# Patient Record
Sex: FEMALE | Race: WHITE | NOT HISPANIC OR LATINO | Employment: OTHER | URBAN - METROPOLITAN AREA
[De-identification: names, ages, dates, MRNs, and addresses within clinical notes are randomized per-mention and may not be internally consistent; named-entity substitution may affect disease eponyms.]

---

## 2017-04-23 ENCOUNTER — COMMUNICATION - HEALTHEAST (OUTPATIENT)
Dept: INTERNAL MEDICINE | Facility: CLINIC | Age: 67
End: 2017-04-23

## 2017-04-23 DIAGNOSIS — I10 HYPERTENSION: ICD-10-CM

## 2017-05-17 ENCOUNTER — OFFICE VISIT - HEALTHEAST (OUTPATIENT)
Dept: INTERNAL MEDICINE | Facility: CLINIC | Age: 67
End: 2017-05-17

## 2017-05-17 DIAGNOSIS — R07.89 CHEST DISCOMFORT: ICD-10-CM

## 2017-05-17 DIAGNOSIS — E05.00 TOXIC DIFFUSE GOITER: ICD-10-CM

## 2017-05-17 DIAGNOSIS — I10 HYPERTENSION: ICD-10-CM

## 2017-05-17 DIAGNOSIS — E78.00 HYPERCHOLESTEROLEMIA: ICD-10-CM

## 2017-05-17 LAB
CHOLEST SERPL-MCNC: 221 MG/DL
FASTING STATUS PATIENT QL REPORTED: YES
HDLC SERPL-MCNC: 63 MG/DL
LDLC SERPL CALC-MCNC: 145 MG/DL
TRIGL SERPL-MCNC: 63 MG/DL

## 2017-05-17 ASSESSMENT — MIFFLIN-ST. JEOR: SCORE: 1013.27

## 2017-05-22 LAB
ATRIAL RATE - MUSE: 56 BPM
DIASTOLIC BLOOD PRESSURE - MUSE: NORMAL MMHG
INTERPRETATION ECG - MUSE: NORMAL
P AXIS - MUSE: 72 DEGREES
PR INTERVAL - MUSE: 134 MS
QRS DURATION - MUSE: 86 MS
QT - MUSE: 440 MS
QTC - MUSE: 424 MS
R AXIS - MUSE: 47 DEGREES
SYSTOLIC BLOOD PRESSURE - MUSE: NORMAL MMHG
T AXIS - MUSE: 48 DEGREES
VENTRICULAR RATE- MUSE: 56 BPM

## 2017-06-14 ENCOUNTER — HOSPITAL ENCOUNTER (OUTPATIENT)
Dept: CARDIOLOGY | Facility: HOSPITAL | Age: 67
Discharge: HOME | End: 2017-06-14
Attending: INTERNAL MEDICINE

## 2017-06-14 DIAGNOSIS — R07.89 CHEST DISCOMFORT: ICD-10-CM

## 2017-06-14 LAB
CV STRESS CURRENT BP HE: NORMAL
CV STRESS CURRENT HR HE: 101
CV STRESS CURRENT HR HE: 104
CV STRESS CURRENT HR HE: 111
CV STRESS CURRENT HR HE: 115
CV STRESS CURRENT HR HE: 115
CV STRESS CURRENT HR HE: 119
CV STRESS CURRENT HR HE: 119
CV STRESS CURRENT HR HE: 125
CV STRESS CURRENT HR HE: 125
CV STRESS CURRENT HR HE: 129
CV STRESS CURRENT HR HE: 134
CV STRESS CURRENT HR HE: 139
CV STRESS CURRENT HR HE: 146
CV STRESS CURRENT HR HE: 147
CV STRESS CURRENT HR HE: 147
CV STRESS CURRENT HR HE: 148
CV STRESS CURRENT HR HE: 148
CV STRESS CURRENT HR HE: 56
CV STRESS CURRENT HR HE: 59
CV STRESS CURRENT HR HE: 62
CV STRESS CURRENT HR HE: 75
CV STRESS CURRENT HR HE: 79
CV STRESS CURRENT HR HE: 80
CV STRESS CURRENT HR HE: 82
CV STRESS CURRENT HR HE: 82
CV STRESS CURRENT HR HE: 94
CV STRESS CURRENT HR HE: 96
CV STRESS CURRENT HR HE: 96
CV STRESS CURRENT HR HE: 97
CV STRESS CURRENT HR HE: NORMAL
CV STRESS DEVIATION TIME HE: NORMAL
CV STRESS ECHO PERCENT HR HE: NORMAL
CV STRESS EXERCISE STAGE HE: NORMAL
CV STRESS FINAL RESTING BP HE: NORMAL
CV STRESS MAX HR HE: 148
CV STRESS MAX TREADMILL GRADE HE: 14
CV STRESS MAX TREADMILL SPEED HE: 3.4
CV STRESS PEAK DIA BP HE: NORMAL
CV STRESS PEAK SYS BP HE: NORMAL
CV STRESS PHASE HE: NORMAL
CV STRESS PROTOCOL HE: NORMAL
CV STRESS RESTING PT POSITION HE: NORMAL
CV STRESS ST DEVIATION AMOUNT HE: NORMAL
CV STRESS ST DEVIATION ELEVATION HE: NORMAL
CV STRESS ST EVELATION AMOUNT HE: NORMAL
CV STRESS TEST TYPE HE: NORMAL
CV STRESS TOTAL STAGE TIME MIN 1 HE: NORMAL
ECHO EJECTION FRACTION ESTIMATED: 60 %
STRESS ECHO BASELINE BP: NORMAL
STRESS ECHO BASELINE HR: 62
STRESS ECHO CALCULATED PERCENT HR: 95 %
STRESS ECHO LAST STRESS BP: NORMAL
STRESS ECHO LAST STRESS HR: 146
STRESS ECHO POST ESTIMATED WORKLOAD: 10.3
STRESS ECHO POST EXERCISE DUR MIN: 8
STRESS ECHO POST EXERCISE DUR SEC: 59
STRESS ECHO TARGET HR: 133

## 2017-07-31 ENCOUNTER — COMMUNICATION - HEALTHEAST (OUTPATIENT)
Dept: INTERNAL MEDICINE | Facility: CLINIC | Age: 67
End: 2017-07-31

## 2017-07-31 RX ORDER — METOPROLOL SUCCINATE 25 MG/1
12.5 TABLET, EXTENDED RELEASE ORAL DAILY
Qty: 30 TABLET | Refills: 11 | Status: SHIPPED | OUTPATIENT
Start: 2017-07-31 | End: 2021-12-13

## 2018-06-28 ENCOUNTER — COMMUNICATION - HEALTHEAST (OUTPATIENT)
Dept: INTERNAL MEDICINE | Facility: CLINIC | Age: 68
End: 2018-06-28

## 2018-06-28 DIAGNOSIS — I10 HYPERTENSION: ICD-10-CM

## 2018-11-29 ENCOUNTER — AMBULATORY - HEALTHEAST (OUTPATIENT)
Dept: FAMILY MEDICINE | Facility: CLINIC | Age: 68
End: 2018-11-29

## 2018-11-29 ENCOUNTER — OFFICE VISIT - HEALTHEAST (OUTPATIENT)
Dept: INTERNAL MEDICINE | Facility: CLINIC | Age: 68
End: 2018-11-29

## 2018-11-29 DIAGNOSIS — R23.8 SKIN IRRITATION: ICD-10-CM

## 2018-11-29 DIAGNOSIS — Z12.11 SCREEN FOR COLON CANCER: ICD-10-CM

## 2018-11-29 DIAGNOSIS — Z12.31 VISIT FOR SCREENING MAMMOGRAM: ICD-10-CM

## 2018-11-29 DIAGNOSIS — Z23 FLU VACCINE NEED: ICD-10-CM

## 2018-11-29 DIAGNOSIS — R79.89 ABNORMAL CBC: ICD-10-CM

## 2018-11-29 LAB
ALBUMIN SERPL-MCNC: 4 G/DL (ref 3.5–5)
ALP SERPL-CCNC: 54 U/L (ref 45–120)
ALT SERPL W P-5'-P-CCNC: 15 U/L (ref 0–45)
ANION GAP SERPL CALCULATED.3IONS-SCNC: 9 MMOL/L (ref 5–18)
AST SERPL W P-5'-P-CCNC: 20 U/L (ref 0–40)
BASOPHILS # BLD AUTO: 0 THOU/UL (ref 0–0.2)
BASOPHILS NFR BLD AUTO: 1 % (ref 0–2)
BILIRUB SERPL-MCNC: 0.7 MG/DL (ref 0–1)
BUN SERPL-MCNC: 23 MG/DL (ref 8–22)
C REACTIVE PROTEIN LHE: <0.1 MG/DL (ref 0–0.8)
CALCIUM SERPL-MCNC: 9.5 MG/DL (ref 8.5–10.5)
CHLORIDE BLD-SCNC: 106 MMOL/L (ref 98–107)
CO2 SERPL-SCNC: 30 MMOL/L (ref 22–31)
CREAT SERPL-MCNC: 0.66 MG/DL (ref 0.6–1.1)
EOSINOPHIL # BLD AUTO: 0.1 THOU/UL (ref 0–0.4)
EOSINOPHIL NFR BLD AUTO: 2 % (ref 0–6)
ERYTHROCYTE [DISTWIDTH] IN BLOOD BY AUTOMATED COUNT: 10.1 % (ref 11–14.5)
GFR SERPL CREATININE-BSD FRML MDRD: >60 ML/MIN/1.73M2
GLUCOSE BLD-MCNC: 90 MG/DL (ref 70–125)
HCT VFR BLD AUTO: 37.7 % (ref 35–47)
HGB BLD-MCNC: 13.1 G/DL (ref 12–16)
LYMPHOCYTES # BLD AUTO: 1.3 THOU/UL (ref 0.8–4.4)
LYMPHOCYTES NFR BLD AUTO: 31 % (ref 20–40)
MCH RBC QN AUTO: 31.6 PG (ref 27–34)
MCHC RBC AUTO-ENTMCNC: 34.8 G/DL (ref 32–36)
MCV RBC AUTO: 91 FL (ref 80–100)
MONOCYTES # BLD AUTO: 0.5 THOU/UL (ref 0–0.9)
MONOCYTES NFR BLD AUTO: 13 % (ref 2–10)
NEUTROPHILS # BLD AUTO: 2.2 THOU/UL (ref 2–7.7)
NEUTROPHILS NFR BLD AUTO: 53 % (ref 50–70)
PLATELET # BLD AUTO: 181 THOU/UL (ref 140–440)
PMV BLD AUTO: 7.3 FL (ref 7–10)
POTASSIUM BLD-SCNC: 4.2 MMOL/L (ref 3.5–5)
PROT SERPL-MCNC: 7 G/DL (ref 6–8)
RBC # BLD AUTO: 4.14 MILL/UL (ref 3.8–5.4)
SODIUM SERPL-SCNC: 145 MMOL/L (ref 136–145)
T4 FREE SERPL-MCNC: 1.1 NG/DL (ref 0.7–1.8)
TSH SERPL DL<=0.005 MIU/L-ACNC: 2.74 UIU/ML (ref 0.3–5)
WBC: 4.1 THOU/UL (ref 4–11)

## 2018-11-29 ASSESSMENT — MIFFLIN-ST. JEOR: SCORE: 1012.55

## 2018-12-01 LAB
BASOPHILS # BLD AUTO: 0 THOU/UL (ref 0–0.2)
BASOPHILS NFR BLD AUTO: 1 % (ref 0–2)
EOSINOPHIL # BLD AUTO: 0.1 THOU/UL (ref 0–0.4)
EOSINOPHIL NFR BLD AUTO: 1 % (ref 0–6)
ERYTHROCYTE [DISTWIDTH] IN BLOOD BY AUTOMATED COUNT: 11.1 % (ref 11–14.5)
HCT VFR BLD AUTO: 40.7 % (ref 35–47)
HGB BLD-MCNC: 13.1 G/DL (ref 12–16)
LYMPHOCYTES # BLD AUTO: 1.2 THOU/UL (ref 0.8–4.4)
LYMPHOCYTES NFR BLD AUTO: 29 % (ref 20–40)
MCH RBC QN AUTO: 31 PG (ref 27–34)
MCHC RBC AUTO-ENTMCNC: 32.2 G/DL (ref 32–36)
MCV RBC AUTO: 96 FL (ref 80–100)
MONOCYTES # BLD AUTO: 0.7 THOU/UL (ref 0–0.9)
MONOCYTES NFR BLD AUTO: 15 % (ref 2–10)
NEUTROPHILS # BLD AUTO: 2.3 THOU/UL (ref 2–7.7)
NEUTROPHILS NFR BLD AUTO: 54 % (ref 50–70)
PATH REPORT.MICROSCOPIC SPEC OTHER STN: ABNORMAL
PLAT MORPH BLD: NORMAL
PLATELET # BLD AUTO: 201 THOU/UL (ref 140–440)
PMV BLD AUTO: 11 FL (ref 8.5–12.5)
RBC # BLD AUTO: 4.23 MILL/UL (ref 3.8–5.4)
REACTIVE LYMPHS: ABNORMAL
WBC: 4.3 THOU/UL (ref 4–11)

## 2018-12-03 LAB
LAB AP CHARGES (HE HISTORICAL CONVERSION): NORMAL
PATH REPORT.COMMENTS IMP SPEC: NORMAL
PATH REPORT.COMMENTS IMP SPEC: NORMAL
PATH REPORT.FINAL DX SPEC: NORMAL
PATH REPORT.MICROSCOPIC SPEC OTHER STN: NORMAL
PATH REPORT.RELEVANT HX SPEC: NORMAL

## 2018-12-17 ENCOUNTER — COMMUNICATION - HEALTHEAST (OUTPATIENT)
Dept: INTERNAL MEDICINE | Facility: CLINIC | Age: 68
End: 2018-12-17

## 2018-12-19 ENCOUNTER — COMMUNICATION - HEALTHEAST (OUTPATIENT)
Dept: INTERNAL MEDICINE | Facility: CLINIC | Age: 68
End: 2018-12-19

## 2019-01-16 ENCOUNTER — RECORDS - HEALTHEAST (OUTPATIENT)
Dept: ADMINISTRATIVE | Facility: OTHER | Age: 69
End: 2019-01-16

## 2019-01-16 ENCOUNTER — TRANSFERRED RECORDS (OUTPATIENT)
Dept: HEALTH INFORMATION MANAGEMENT | Facility: CLINIC | Age: 69
End: 2019-01-16

## 2019-01-16 LAB — COLOGUARD-ABSTRACT: NEGATIVE

## 2019-04-30 ENCOUNTER — COMMUNICATION - HEALTHEAST (OUTPATIENT)
Dept: INTERNAL MEDICINE | Facility: CLINIC | Age: 69
End: 2019-04-30

## 2019-11-17 ENCOUNTER — COMMUNICATION - HEALTHEAST (OUTPATIENT)
Dept: INTERNAL MEDICINE | Facility: CLINIC | Age: 69
End: 2019-11-17

## 2019-11-17 DIAGNOSIS — I10 HYPERTENSION: ICD-10-CM

## 2019-11-21 ENCOUNTER — COMMUNICATION - HEALTHEAST (OUTPATIENT)
Dept: MAMMOGRAPHY | Facility: CLINIC | Age: 69
End: 2019-11-21

## 2020-06-19 ENCOUNTER — OFFICE VISIT - HEALTHEAST (OUTPATIENT)
Dept: FAMILY MEDICINE | Facility: CLINIC | Age: 70
End: 2020-06-19

## 2020-06-19 DIAGNOSIS — H61.22 IMPACTED CERUMEN OF LEFT EAR: ICD-10-CM

## 2020-12-18 ENCOUNTER — COMMUNICATION - HEALTHEAST (OUTPATIENT)
Dept: FAMILY MEDICINE | Facility: CLINIC | Age: 70
End: 2020-12-18

## 2020-12-18 DIAGNOSIS — I10 HYPERTENSION: ICD-10-CM

## 2020-12-18 RX ORDER — ATENOLOL 25 MG/1
TABLET ORAL
Qty: 90 TABLET | Refills: 3 | Status: SHIPPED | OUTPATIENT
Start: 2020-12-18 | End: 2021-12-13

## 2021-05-25 ENCOUNTER — RECORDS - HEALTHEAST (OUTPATIENT)
Dept: ADMINISTRATIVE | Facility: CLINIC | Age: 71
End: 2021-05-25

## 2021-05-26 ENCOUNTER — RECORDS - HEALTHEAST (OUTPATIENT)
Dept: ADMINISTRATIVE | Facility: CLINIC | Age: 71
End: 2021-05-26

## 2021-05-27 ENCOUNTER — RECORDS - HEALTHEAST (OUTPATIENT)
Dept: ADMINISTRATIVE | Facility: CLINIC | Age: 71
End: 2021-05-27

## 2021-05-29 ENCOUNTER — RECORDS - HEALTHEAST (OUTPATIENT)
Dept: ADMINISTRATIVE | Facility: CLINIC | Age: 71
End: 2021-05-29

## 2021-05-31 VITALS — HEIGHT: 64 IN | WEIGHT: 111.6 LBS | BODY MASS INDEX: 19.05 KG/M2

## 2021-06-02 VITALS — BODY MASS INDEX: 20.05 KG/M2 | WEIGHT: 113.19 LBS | HEIGHT: 63 IN

## 2021-06-03 NOTE — TELEPHONE ENCOUNTER
RN cannot approve Refill Request    RN can NOT refill this medication PCP to review.  Due this month for office visit.. Last office visit: 5/17/2017 Cary Redd MD Last Physical: 4/20/2016 Last MTM visit: Visit date not found Last visit same specialty: 11/29/2018 Diaz Campos MD.  Next visit within 3 mo: Visit date not found  Next physical within 3 mo: Visit date not found      Zhanna Degroot, [facility] Triage/Med Refill 11/17/2019    Requested Prescriptions   Pending Prescriptions Disp Refills     atenolol (TENORMIN) 25 MG tablet 90 tablet 3     Sig: TAKE 1/2 TABLET BY MOUTH TWICE DAILY.       Beta-Blockers Refill Protocol Passed - 11/17/2019 11:20 PM        Passed - PCP or prescribing provider visit in past 12 months or next 3 months     Last office visit with prescriber/PCP: 5/17/2017 Cary Redd MD OR same dept: 11/29/2018 Diaz Campos MD OR same specialty: 11/29/2018 Diaz Campos MD  Last physical: 4/20/2016 Last MTM visit: Visit date not found   Next visit within 3 mo: Visit date not found  Next physical within 3 mo: Visit date not found  Prescriber OR PCP: Cary Redd MD  Last diagnosis associated with med order: 1. Hypertension  - atenolol (TENORMIN) 25 MG tablet; TAKE 1/2 TABLET BY MOUTH TWICE DAILY.  Dispense: 90 tablet; Refill: 3    If protocol passes may refill for 12 months if within 3 months of last provider visit (or a total of 15 months).             Passed - Blood pressure filed in past 12 months     BP Readings from Last 1 Encounters:   11/29/18 116/64

## 2021-06-04 VITALS
DIASTOLIC BLOOD PRESSURE: 64 MMHG | BODY MASS INDEX: 20.43 KG/M2 | WEIGHT: 115.31 LBS | HEART RATE: 61 BPM | SYSTOLIC BLOOD PRESSURE: 132 MMHG | OXYGEN SATURATION: 98 %

## 2021-06-09 NOTE — PROGRESS NOTES
ASSESSMENT/PLAN:  Impacted cerumen of left ear  Offered to perform cerumen extraction in the clinic but she wants to try at home first  Advise debrox to loosening the wax  May want to consider prophylaxis with mineral oil application three times per year  F/u prn    SUBJECTIVE:    Rachel Dixon is a 69 y.o. female who came in today     Left ear fullness x several months   Feels obstructed when she inserts q-tips for cleaning  Feels full when pushing her left ear  No pain, discharge, fever, vertigo, tinnitus, hearing loss  No h/o impacted cerumen  Does not use hearing aide  Does not use ear plugs often    Review of Systems (except those mentioned above)  Constitutional: Negative.   HENT: Negative.   Eyes: Negative.   Respiratory: Negative.   Cardiovascular: Negative.   Gastrointestinal: Negative.   Endocrine: Negative.   Genitourinary: Negative.   Musculoskeletal: Negative.   Skin: Negative.   Allergic/Immunologic: Negative.   Neurological: Negative.   Hematological: Negative.   Psychiatric/Behavioral: Negative.     Patient Active Problem List    Diagnosis Date Noted     Hyperlipidemia      Graves' Disease      Hyperthyroidism      Osteopenia      No Known Allergies  Current Outpatient Medications   Medication Sig Dispense Refill     atenolol (TENORMIN) 25 MG tablet TAKE 1/2 TABLET BY MOUTH TWICE DAILY. 90 tablet 3     aspirin 81 MG EC tablet Take 1 tablet (81 mg total) by mouth daily.  0     cholecalciferol, vitamin D3, 2,000 unit cap Take 1 capsule by mouth daily.       diphenhydrAMINE (BENADRYL) 25 mg tablet Take 25 mg by mouth at bedtime as needed for sleep.       metoprolol succinate (TOPROL XL) 25 MG Take 0.5 tablets (12.5 mg total) by mouth daily. 30 tablet 11     No current facility-administered medications for this visit.      Past Medical History:   Diagnosis Date     Hypertension      No past surgical history on file.  Social History     Socioeconomic History     Marital status:      Spouse name:  None     Number of children: None     Years of education: None     Highest education level: None   Occupational History     None   Social Needs     Financial resource strain: None     Food insecurity     Worry: None     Inability: None     Transportation needs     Medical: None     Non-medical: None   Tobacco Use     Smoking status: Never Smoker     Smokeless tobacco: Never Used   Substance and Sexual Activity     Alcohol use: None     Drug use: None     Sexual activity: None   Lifestyle     Physical activity     Days per week: None     Minutes per session: None     Stress: None   Relationships     Social connections     Talks on phone: None     Gets together: None     Attends Mosque service: None     Active member of club or organization: None     Attends meetings of clubs or organizations: None     Relationship status: None     Intimate partner violence     Fear of current or ex partner: None     Emotionally abused: None     Physically abused: None     Forced sexual activity: None   Other Topics Concern     None   Social History Narrative     None     No family history on file.      OBJECTIVE:    Vitals:    06/19/20 1321 06/19/20 1325   BP: 140/63 132/64   Patient Site: Right Arm Left Arm   Patient Position: Sitting Sitting   Cuff Size: Adult Regular Adult Regular   Pulse: 61    SpO2: 98%    Weight: 115 lb 5 oz (52.3 kg)      Body mass index is 20.43 kg/m .    Physical Exam:  Constitutional: Patient was oriented to person, place, and time. Patient appeared well-developed and well-nourished. No distress.   Head: Normocephalic and atraumatic.   Right Ear: External ear normal. Normal TM  Left Ear: External ear normal. Impacted cerumen with 100% obstruction  Skin: Skin was warm and dry. No rash noted. Patient was not diaphoretic. No erythema. No pallor.

## 2021-06-10 NOTE — PROGRESS NOTES
[facility] Follow Up Note    Assessment/Plan:  1. Chest discomfort  Typical and symptom presentation but atypical in that it occurs at rest and not with exertion.  Strong family history of premature coronary artery disease.  Personal risk factors include hypertension and hyperlipidemia-mild.  Recommendation: We will proceed with stress echocardiogram non-emergently as she has not had an episode within the last couple of weeks.  Therapeutics: If chest pain recurs, try antacid.  If no immediate relief, consider taking an additional aspirin and presenting to the emergency center for complete evaluation.  Will update labs as below and reassess cardiovascular risk factors.    - Electrocardiogram Perform and Read-personally reviewed.  Reveals normal sinus rhythm with no acute abnormality  - Echo Stress Exercise; Future  - High Sensitivity C-Reactive Protein(hsCR  - HM2(CBC w/o Differential)    2. Hypercholesterolemia  Mild dyslipidemia with family history of coronary disease  We will recheck lipids  - Lipid [place] FASTING    4.  History of Graves' Disease  Will check TSH  - Thyroid Stimulating Hormone (TSH)        Cary Redd MD    Chief Complaint:  Chief Complaint   Patient presents with     Chest Pain     More of a Discomfort      Follow-up       History of Present Illness:  Rachel is a 66 y.o. female who is here today for evaluation of an intermittent atypical chest discomfort.  Of note, she has not had it in about 3 weeks.  She states the pain is associated in the left anterior chest and sometimes radiates to the left arm and shoulder.  It never lasts more than 20 minutes.  It is not associated with diaphoresis, shortness of breath or wheezing.  There is an occasional chest flutter with this.  She states she does not notice this when she is exercising.  She and her  can do fairly extensive and lengthy walks without difficulty.  Her cardiac risk factors include hypertension and  "hyperlipidemia-mild.  There is also a very strong family history of coronary and peripheral vascular occlusive decides.  Of note she is a non-smoker.    Health maintenance is discussed.  Colon cancer screening is up-to-date.  Pap is up-to-date.  She would like to do mammography every other year.    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  Work is stressful.  History   Smoking Status     Never Smoker   Smokeless Tobacco     Not on file       Past History: As noted in the snapshot  Current Outpatient Prescriptions   Medication Sig Dispense Refill     aspirin 81 MG EC tablet Take 1 tablet (81 mg total) by mouth daily.  0     cholecalciferol, vitamin D3, 2,000 unit cap Take 1 capsule by mouth daily.       atenolol (TENORMIN) 25 MG tablet TAKE 1/2 TABLET BY MOUTH TWICE DAILY. 90 tablet 3     No current facility-administered medications for this visit.        Family History: Aburto family history for coronary artery disease    Physical Exam:  General Appearance:   She appears well and comfortable and in no acute distress  Vitals:    05/17/17 1026   BP: 124/62   Patient Site: Left Arm   Patient Position: Sitting   Cuff Size: Adult Regular   Pulse: 62   SpO2: 98%   Weight: 111 lb 9.6 oz (50.6 kg)   Height: 5' 3.5\" (1.613 m)     Wt Readings from Last 3 Encounters:   05/17/17 111 lb 9.6 oz (50.6 kg)   04/20/16 109 lb (49.4 kg)     Body mass index is 19.46 kg/(m^2).    EYES: Eyelids, conjunctiva, and sclera were normal. Pupils were normal. Cornea, iris, and lens were normal bilaterally.  HEAD, EARS, NOSE, MOUTH, AND THROAT: Head and face were normal. Hearing was normal to voice and the ears were normal to external exam. Nose appearance was normal and there was no discharge. Oropharynx was normal.  TMs were normal.  NECK: Neck appearance was normal. There were no neck masses and the " thyroid was not enlarged.  RESPIRATORY: Breathing pattern was normal and the chest moved symmetrically.   Lung sounds were equal bilaterally.  CARDIOVASCULAR: Heart rate and rhythm were normal.  S1 and S2 were normal and there were no extra sounds or murmurs. Peripheral pulses in arms and legs were normal.  Jugular venous pressure was normal.  There was no peripheral edema.  GASTROINTESTINAL: The abdomen was normal in contour.  Bowel sounds were present.   Palpation detected no tenderness, mass, or enlarged organs.   MUSCULOSKELETAL: Skeletal configuration was normal and muscle mass was normal for age. Joint appearance was overall normal.  LYMPHATIC: There were no enlarged nodes.  SKIN/HAIR/NAILS: Skin color was normal.  There were no abnormal skin lesions.  Hair and nails were normal.  NEUROLOGIC: The patient was alert and oriented to person, place, time, and circumstance. Speech was normal. Cranial nerves were normal. Motor strength was normal for age. The patient was normally coordinated.  PSYCHIATRIC:  Mood and affect were normal and the patient had normal recent and remote memory. The patient's judgment and insight were normal.

## 2021-06-22 NOTE — PROGRESS NOTES
"[facility] Clinic Note    Patient Name: Rachel Dixon  Patient Age: 68 y.o.  YOB: 1950  MRN: [id number]    Date of visit: 11/29/2018    Patient Active Problem List   Diagnosis     Hyperlipidemia     Graves' Disease     Hyperthyroidism     Osteopenia     Social History     Social History Narrative     Not on file     No family history on file.  Outpatient Encounter Medications as of 11/29/2018   Medication Sig Dispense Refill     aspirin 81 MG EC tablet Take 1 tablet (81 mg total) by mouth daily.  0     atenolol (TENORMIN) 25 MG tablet TAKE 1/2 TABLET BY MOUTH TWICE DAILY. 90 tablet 3     cholecalciferol, vitamin D3, 2,000 unit cap Take 1 capsule by mouth daily.       diphenhydrAMINE (BENADRYL) 25 mg tablet Take 25 mg by mouth at bedtime as needed for sleep.       metoprolol succinate (TOPROL XL) 25 MG Take 0.5 tablets (12.5 mg total) by mouth daily. 30 tablet 11     No facility-administered encounter medications on file as of 11/29/2018.        Chief Complaint:   Chief Complaint   Patient presents with     Foot Problem     toes on bilateral feet get inflammed on and off for varying amounts of time, painful     Colon Cancer Screening     patient due, orders pending     Mammogram     patient due, orders pending       /64 (Patient Site: Left Arm, Patient Position: Sitting, Cuff Size: Adult Regular)   Pulse 78   Ht 5' 3\" (1.6 m)   Wt 113 lb 3 oz (51.3 kg)   SpO2 98%   BMI 20.05 kg/m    HPI:   For the past 2 years if she wears socks her toes get erythematous, sore, hot and edematous.  Mostly lateral 3 toes of left foot and tip of 1,2 digits. It will last until 2 hours to two days.  This seems to happen when feet get warm - it is not just when she walks.  It doesn't seem to happen when she is under the covers.  This is becoming more persistent with lateral 3 toes.  Had no difficulty in summer time.      ROS: Pertinent ros findings in hpi, all other systems negative.    Objective/Physical Exam: " "    /64 (Patient Site: Left Arm, Patient Position: Sitting, Cuff Size: Adult Regular)   Pulse 78   Ht 5' 3\" (1.6 m)   Wt 113 lb 3 oz (51.3 kg)   SpO2 98%   BMI 20.05 kg/m      Gen: NAD, appears age  Skin: warm, dry  HENT: normocephalic atraumatic, MMM  Eyes: non-icteric, no proptosis  CV: NRRR no m/r/g, no peripheral edema  Resp: CTAB no w/r/r, normal respiratory effort  Abd: non-distended, soft  Hematologic: No petechiae or purpura    Neuro: no dysarthria or gross asymmetry  Psych: Cooperative, full affect    Left foot there is some erythema and mild swelling of the toes mostly plantar surface and laterally.  There is no evidence of skin breakdown.  She also has some erythema at the distal tips of her first and second digits on that side.  She also has mild erythema on the right fourth toe.  Dorsalis pedis 2+ bilaterally, monofilament test is normal.  Great cap refill and it blanches with pressure.  5 out of 5 strength.  No other abnormalities.    Assessment/Plan:  No results found for this or any previous visit (from the past 24 hour(s)).  No medications were ordered this encounter      ICD-10-CM    1. Visit for screening mammogram Z12.31    2. Screen for colon cancer Z12.11    3. Flu vaccine need Z23 Influenza High Dose, Seasonal   4. Skin irritation R23.8 Cologuard     Ambulatory referral to Dermatology     Comprehensive Metabolic Panel     HM1(CBC and Differential)     Thyroid Stimulating Hormone (TSH)     C-Reactive Protein     T4, Free     HM1 (CBC with Diff)       I do not have a good explanation for her symptoms, however, on the differential would be bullous pemphigoid since she has had blisters at times, vasculitis.  We will check a CRP, basic lab work as well as her see dermatology.    Patient Instructions   Plant Based Diet Handout    Eat abundant vegetables.    Only eat whole grains.    2-4 fruits/day.    Enjoy at least 2 servings of legumes (beans) or tofu daily.      Avoid animal products " such as dairy, eggs and meat. (Replace these with 1,000mcg vitamin B12 and a calcium/vitamin D supplement such as Caltrate+D3 daily.)    Avoid processed foods, sugars and oils.    Cook your own food as much as possible.    Keep a food diary and ask someone else to diet with you.    Drink 8 glasses of water a day.    [place] over [employer] Documentary - watch online.    If any worsening let me know      Counseled patient regarding treatments, treatment options, risks and benefits and diagnosis.  The patient was interactive, attentive, verbalized understanding, and we discussed plan.     Diaz Campos MD

## 2021-06-27 ENCOUNTER — HEALTH MAINTENANCE LETTER (OUTPATIENT)
Age: 71
End: 2021-06-27

## 2021-07-03 NOTE — ADDENDUM NOTE
Addendum Note by Madeline Campos MD at 11/29/2018 11:30 AM     Author: Madeline Campos MD Service: -- Author Type: Physician    Filed: 11/29/2018  8:59 PM Encounter Date: 11/29/2018 Status: Signed    : Madeline Campos MD (Physician)    Addended by: MADELINE CAMPOS on: 11/29/2018 08:59 PM        Modules accepted: Orders

## 2021-07-21 ENCOUNTER — RECORDS - HEALTHEAST (OUTPATIENT)
Dept: ADMINISTRATIVE | Facility: CLINIC | Age: 71
End: 2021-07-21

## 2021-10-16 ENCOUNTER — HEALTH MAINTENANCE LETTER (OUTPATIENT)
Age: 71
End: 2021-10-16

## 2021-12-07 ASSESSMENT — ACTIVITIES OF DAILY LIVING (ADL): CURRENT_FUNCTION: NO ASSISTANCE NEEDED

## 2021-12-13 ENCOUNTER — OFFICE VISIT (OUTPATIENT)
Dept: FAMILY MEDICINE | Facility: CLINIC | Age: 71
End: 2021-12-13
Payer: COMMERCIAL

## 2021-12-13 VITALS
SYSTOLIC BLOOD PRESSURE: 130 MMHG | HEIGHT: 63 IN | WEIGHT: 112.4 LBS | BODY MASS INDEX: 19.91 KG/M2 | HEART RATE: 64 BPM | DIASTOLIC BLOOD PRESSURE: 68 MMHG

## 2021-12-13 DIAGNOSIS — Z82.49 FAMILY HISTORY OF ABDOMINAL AORTIC ANEURYSM (AAA): ICD-10-CM

## 2021-12-13 DIAGNOSIS — M94.9 DISORDER OF BONE AND CARTILAGE: ICD-10-CM

## 2021-12-13 DIAGNOSIS — Z78.0 POST-MENOPAUSAL: ICD-10-CM

## 2021-12-13 DIAGNOSIS — Z12.31 ENCOUNTER FOR SCREENING MAMMOGRAM FOR BREAST CANCER: ICD-10-CM

## 2021-12-13 DIAGNOSIS — M89.9 DISORDER OF BONE AND CARTILAGE: ICD-10-CM

## 2021-12-13 DIAGNOSIS — Z76.89 ENCOUNTER TO ESTABLISH CARE: Primary | ICD-10-CM

## 2021-12-13 DIAGNOSIS — I73.9 PAD (PERIPHERAL ARTERY DISEASE) (H): ICD-10-CM

## 2021-12-13 DIAGNOSIS — E05.90 THYROTOXICOSIS WITHOUT THYROID STORM, UNSPECIFIED THYROTOXICOSIS TYPE: ICD-10-CM

## 2021-12-13 DIAGNOSIS — Z00.00 ENCOUNTER FOR MEDICARE ANNUAL WELLNESS EXAM: ICD-10-CM

## 2021-12-13 LAB
ANION GAP SERPL CALCULATED.3IONS-SCNC: 11 MMOL/L (ref 5–18)
BUN SERPL-MCNC: 19 MG/DL (ref 8–22)
CALCIUM SERPL-MCNC: 9.1 MG/DL (ref 8.5–10.5)
CHLORIDE BLD-SCNC: 106 MMOL/L (ref 98–107)
CHOLEST SERPL-MCNC: 246 MG/DL
CO2 SERPL-SCNC: 26 MMOL/L (ref 22–31)
CREAT SERPL-MCNC: 0.67 MG/DL (ref 0.6–1.1)
ERYTHROCYTE [DISTWIDTH] IN BLOOD BY AUTOMATED COUNT: 11.1 % (ref 10–15)
FASTING STATUS PATIENT QL REPORTED: ABNORMAL
GFR SERPL CREATININE-BSD FRML MDRD: 90 ML/MIN/1.73M2
GLUCOSE BLD-MCNC: 96 MG/DL (ref 70–125)
HCT VFR BLD AUTO: 40.1 % (ref 35–47)
HDLC SERPL-MCNC: 64 MG/DL
HGB BLD-MCNC: 13.6 G/DL (ref 11.7–15.7)
LDLC SERPL CALC-MCNC: 168 MG/DL
MCH RBC QN AUTO: 31.6 PG (ref 26.5–33)
MCHC RBC AUTO-ENTMCNC: 33.9 G/DL (ref 31.5–36.5)
MCV RBC AUTO: 93 FL (ref 78–100)
PLATELET # BLD AUTO: 176 10E3/UL (ref 150–450)
POTASSIUM BLD-SCNC: 4.1 MMOL/L (ref 3.5–5)
RBC # BLD AUTO: 4.31 10E6/UL (ref 3.8–5.2)
SODIUM SERPL-SCNC: 143 MMOL/L (ref 136–145)
TRIGL SERPL-MCNC: 72 MG/DL
TSH SERPL DL<=0.005 MIU/L-ACNC: 2.9 UIU/ML (ref 0.3–5)
WBC # BLD AUTO: 3.4 10E3/UL (ref 4–11)

## 2021-12-13 PROCEDURE — 84443 ASSAY THYROID STIM HORMONE: CPT | Performed by: FAMILY MEDICINE

## 2021-12-13 PROCEDURE — 36415 COLL VENOUS BLD VENIPUNCTURE: CPT | Performed by: FAMILY MEDICINE

## 2021-12-13 PROCEDURE — 85027 COMPLETE CBC AUTOMATED: CPT | Performed by: FAMILY MEDICINE

## 2021-12-13 PROCEDURE — G0438 PPPS, INITIAL VISIT: HCPCS | Performed by: FAMILY MEDICINE

## 2021-12-13 PROCEDURE — 80061 LIPID PANEL: CPT | Performed by: FAMILY MEDICINE

## 2021-12-13 PROCEDURE — 80048 BASIC METABOLIC PNL TOTAL CA: CPT | Performed by: FAMILY MEDICINE

## 2021-12-13 RX ORDER — ATENOLOL 25 MG/1
TABLET ORAL
Qty: 90 TABLET | Refills: 3 | Status: SHIPPED | OUTPATIENT
Start: 2021-12-13 | End: 2023-04-26

## 2021-12-13 ASSESSMENT — ACTIVITIES OF DAILY LIVING (ADL): CURRENT_FUNCTION: NO ASSISTANCE NEEDED

## 2021-12-13 ASSESSMENT — MIFFLIN-ST. JEOR: SCORE: 1003.97

## 2021-12-13 NOTE — PATIENT INSTRUCTIONS
There is a new shingles vaccine that is 97% effective. It is the Shingrix vaccine and is recommended for those 50 and older. We recommend the vaccine even if you have had shingles or the older vaccine against shingles- Zostavax. Insurance coverage for the vaccine varies. I recommend you check with your insurance to verify if, when and where it is covered. The vaccine is covered by most commercial insurance but Medicare does not cover the vaccine. It may be covered by Medicare Part D (your drug/pharmacy plan) and sometimes it is covered only at a pharmacy instead of here in the clinic. If it is covered in the clinic, you may schedule a nurse visit anytime to get the first dose of the vaccine. The second dose is recommended two months after the first and should be gotten by 6 months after the first.   About 10% of people will get a sore, red reaction at the site of the injection. Some people may also feel a little sick or nauseated after the injection. This may happen with either the first and/or second vaccine.      Patient Education   Personalized Prevention Plan  You are due for the preventive services outlined below.  Your care team is available to assist you in scheduling these services.  If you have already completed any of these items, please share that information with your care team to update in your medical record.  Health Maintenance Due   Topic Date Due     ANNUAL REVIEW OF HM ORDERS  Never done     Discuss Advance Care Planning  Never done     Colorectal Cancer Screening  Never done     COVID-19 Vaccine (1) Never done     Hepatitis C Screening  Never done     Zoster (Shingles) Vaccine (2 of 2) 06/15/2016     Pneumococcal Vaccine (1 of 1 - PPSV23) Never done     Mammogram  04/20/2018     FALL RISK ASSESSMENT  11/29/2019     Flu Vaccine (1) 09/01/2021

## 2021-12-13 NOTE — PROGRESS NOTES
"SUBJECTIVE:   Rachel Dixon is a 71 year old female who presents for Preventive Visit.      Patient has been advised of split billing requirements and indicates understanding: Yes   Are you in the first 12 months of your Medicare coverage?  No    Healthy Habits:     In general, how would you rate your overall health?  Good    Frequency of exercise:  2-3 days/week    Duration of exercise:  15-30 minutes    Do you usually eat at least 4 servings of fruit and vegetables a day, include whole grains    & fiber and avoid regularly eating high fat or \"junk\" foods?  Yes    Taking medications regularly:  Yes    Medication side effects:  None    Ability to successfully perform activities of daily living:  No assistance needed    Home Safety:  No safety concerns identified    Hearing Impairment:  No hearing concerns    In the past 6 months, have you been bothered by leaking of urine?  No    In general, how would you rate your overall mental or emotional health?  Good      PHQ-2 Total Score: 0    Additional concerns today:  No      She had a negative cologaurd in 2019  History of hyperthyroidism and took PTU, did not do an ablation. Not on meds in the past 15 years. Has been on atenolol for the palpitations/tremor initially and on it ever since due to less anxiousness feeling.    Her toes bother her: (newly brought up to me, but ongoing issue for years it sounds like) when wearing closed toed shoes and walking she gets swelling of her toes, redness also. Has tried various shoes or socks. Doesn't bother her when sitting for the day. Doesn't really bother her during the summer because she wears sandles. Painful when they are swollen and hard. Her mom with hx of PAD had similar sx.     10 years ago had [place] virus while living here in [place]; achy and low grade fever for 1 week, petechiae on legs, delirious later. Hospitalized for 5 days; spinal tap diagnosed WNV. She took a full year to recover. Multitasking is challenging for " her still.      Hx of left humerus fracture; sling is all she needed for tx.     Mom with peripheral arterial disease, HTN,   at 74yo of dissecting aortic aneurism; as did maternal grandfather.   Sometimes feels her own aorta pulsating.     She declines COVID, Shingrix and FLu vaccines.   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  Travels to [place] in the winter for a few weeks.    [employer]ab[employer]ist at [employer] [facility] [facility].    Rachel Cortez MD           Do you feel safe in your environment? Yes    Have you ever done Advance Care Planning? (For example, a Health Directive, POLST, or a discussion with a medical provider or your loved ones about your wishes): No, advance care planning information given to patient to review.  Patient plans to discuss their wishes with loved ones or provider.         Fall risk  Fallen 2 or more times in the past year?: No  Any fall with injury in the past year?: No    Cognitive Screening   1) Repeat 3 items (Leader, Season, Table)    2) Clock draw: NORMAL  3) 3 item recall: Recalls 3 objects  Results: 3 items recalled: COGNITIVE IMPAIRMENT LESS LIKELY    Mini-CogTM Copyright S Amada. Licensed by the author for use in [facility]; reprinted with permission (sai@.[facility]). All rights reserved.      Do you have sleep apnea, excessive snoring or daytime drowsiness?: yes    Reviewed and updated as needed this visit by clinical staff  Tobacco  Allergies  Meds  Problems  Med Hx  Surg Hx  Fam Hx         Reviewed and updated as needed this visit by Provider  Tobacco  Allergies  Meds  Problems  Med Hx  Surg Hx  Fam Hx        Social History     Tobacco Use     Smoking status: Never  "Smoker     Smokeless tobacco: Never Used   Substance Use Topics     Alcohol use: Never     If you drink alcohol do you typically have >3 drinks per day or >7 drinks per week? No    No flowsheet data found.  Current providers sharing in care for this patient include:   Patient Care Team:  Cary Redd MD as PCP - General  Reinier Navarro MD as Assigned PCP    The following health maintenance items are reviewed in Epic and correct as of today:  Health Maintenance Due   Topic Date Due     ANNUAL REVIEW OF HM ORDERS  Never done     ADVANCE CARE PLANNING  Never done     COLORECTAL CANCER SCREENING  Never done     COVID-19 Vaccine (1) Never done     HEPATITIS C SCREENING  Never done     ZOSTER IMMUNIZATION (2 of 2) 06/15/2016     Pneumococcal Vaccine: 65+ Years (1 of 1 - PPSV23) Never done     MAMMO SCREENING  04/20/2018     FALL RISK ASSESSMENT  11/29/2019     INFLUENZA VACCINE (1) 09/01/2021     Lab work is in process    Breast CA Risk Assessment (FHS-7) 12/7/2021   Do you have a family history of breast, colon, or ovarian cancer? No / Unknown         Pertinent mammograms are reviewed under the imaging tab.    Review of Systems  Constitutional, HEENT, cardiovascular, pulmonary, gi and gu systems are negative, except as otherwise noted.    OBJECTIVE:   /68 (BP Location: Left arm, Patient Position: Sitting, Cuff Size: Adult Regular)   Pulse 64   Ht 1.6 m (5' 3\")   Wt 51 kg (112 lb 6.4 oz)   BMI 19.91 kg/m   Estimated body mass index is 19.91 kg/m  as calculated from the following:    Height as of this encounter: 1.6 m (5' 3\").    Weight as of this encounter: 51 kg (112 lb 6.4 oz).  Physical Exam  GENERAL: healthy, alert and no distress  NECK: no adenopathy, no asymmetry, masses, or scars and thyroid normal to palpation  RESP: lungs clear to auscultation - no rales, rhonchi or wheezes  CV: regular rate and rhythm, normal S1 S2, no S3 or S4, no murmur, click or rub, no peripheral edema and " peripheral pulses strong  ABDOMEN: soft, nontender, no hepatosplenomegaly, no masses and bowel sounds normal; prominent pulsatile abdominal aorta- nontender (pt's body habitus is thin)  MS: no gross musculoskeletal defects noted, no edema. Today toes are generally normal in appearance; slight erythema of the tips of a few toes; normal DP/TP pulses      Diagnostic Test Results:  Labs reviewed in Epic    ASSESSMENT / PLAN:   Rachel was seen today for wellness visit.    Diagnoses and all orders for this visit:    Encounter to establish care  Encounter for Medicare annual wellness exam  - Home safety information was reviewed if pertinent for falls prevention: regular checkups with vision and hearing, mindful medication use nixon with OTCs, using any assisted walking devices, adequate shoes and feet wear, avoiding loose rugs, safety additions to the home if needed, keeping the body in good shape with regular exercise especially walking, and limiting alcohol intake.  - Social history was reviewed  - Patient is independent with activities of daily living  - We reviewed active symptoms and discussed management  - We reviewed list of healthcare providers for this patient.  - We also reviewed PHQ 9    -     TSH with free T4 reflex  -     CBC with platelets  -     Basic metabolic panel  -     Lipid panel reflex to direct LDL Fasting    Family history of abdominal aortic aneurysm (AAA)  -     US Abdominal Aorta Imaging; Future    PAD (peripheral artery disease) (H)  Presumed dx based on hx, exam and family hx. Advised further evaluation with our vascular specialists  -     Vascular Medicine Referral; Future    Osteopenia  Postmenopausal  Declines medication at this time. 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      Patient has been advised of split billing requirements and indicates understanding: Yes  COUNSELING:  Reviewed preventive health counseling, as reflected in patient instructions    Estimated body mass index is 19.91 kg/m  as calculated from the following:    Height as of this encounter: 1.6 m (5' 3\").    Weight as of this encounter: 51 kg (112 lb 6.4 oz).        She reports that she has never smoked. She has never used smokeless tobacco.      Appropriate preventive services were discussed with this patient, including applicable screening as appropriate for cardiovascular disease, diabetes, osteopenia/osteoporosis, and glaucoma.  As appropriate for age/gender, discussed screening for colorectal cancer, prostate cancer, breast cancer, and cervical cancer. Checklist reviewing preventive services available has been given to the patient.    Reviewed patients plan of care and provided an AVS. The Basic Care Plan (routine screening as documented in Health Maintenance) for Rachel meets the Care Plan requirement. This Care Plan has been established and reviewed with the Patient.    Counseling Resources:  ATP IV Guidelines  Pooled Cohorts Equation Calculator  Breast Cancer Risk Calculator  Breast Cancer: Medication to Reduce Risk  FRAX Risk Assessment  ICSI Preventive Guidelines  Dietary Guidelines for Americans, 2010  USDA's MyPlate  ASA Prophylaxis  Lung CA Screening    Rachel Cortez MD  [facility]    Identified Health Risks:  "

## 2021-12-16 ENCOUNTER — HOSPITAL ENCOUNTER (OUTPATIENT)
Dept: ULTRASOUND IMAGING | Facility: CLINIC | Age: 71
Discharge: HOME | End: 2021-12-16
Attending: FAMILY MEDICINE | Admitting: FAMILY MEDICINE
Payer: COMMERCIAL

## 2021-12-16 DIAGNOSIS — Z82.49 FAMILY HISTORY OF ABDOMINAL AORTIC ANEURYSM (AAA): ICD-10-CM

## 2021-12-16 PROCEDURE — 76775 US EXAM ABDO BACK WALL LIM: CPT

## 2021-12-28 ENCOUNTER — ANCILLARY PROCEDURE (OUTPATIENT)
Dept: BONE DENSITY | Facility: CLINIC | Age: 71
End: 2021-12-28
Attending: FAMILY MEDICINE
Payer: COMMERCIAL

## 2021-12-28 ENCOUNTER — ANCILLARY PROCEDURE (OUTPATIENT)
Dept: MAMMOGRAPHY | Facility: CLINIC | Age: 71
End: 2021-12-28
Attending: FAMILY MEDICINE
Payer: COMMERCIAL

## 2021-12-28 DIAGNOSIS — M94.9 DISORDER OF BONE AND CARTILAGE: ICD-10-CM

## 2021-12-28 DIAGNOSIS — M89.9 DISORDER OF BONE AND CARTILAGE: ICD-10-CM

## 2021-12-28 DIAGNOSIS — Z12.31 ENCOUNTER FOR SCREENING MAMMOGRAM FOR BREAST CANCER: ICD-10-CM

## 2021-12-28 DIAGNOSIS — Z78.0 POST-MENOPAUSAL: ICD-10-CM

## 2021-12-28 PROCEDURE — 77067 SCR MAMMO BI INCL CAD: CPT | Mod: TC | Performed by: RADIOLOGY

## 2021-12-28 PROCEDURE — 77063 BREAST TOMOSYNTHESIS BI: CPT | Mod: TC | Performed by: RADIOLOGY

## 2021-12-28 PROCEDURE — 77080 DXA BONE DENSITY AXIAL: CPT | Performed by: INTERNAL MEDICINE

## 2022-01-03 ENCOUNTER — TELEPHONE (OUTPATIENT)
Dept: FAMILY MEDICINE | Facility: CLINIC | Age: 72
End: 2022-01-03
Payer: COMMERCIAL

## 2022-01-03 DIAGNOSIS — Z12.11 SCREENING FOR COLON CANCER: Primary | ICD-10-CM

## 2022-01-03 DIAGNOSIS — Z12.11 SCREENING FOR COLON CANCER: ICD-10-CM

## 2022-01-04 NOTE — PROGRESS NOTES
This encounter was created solely for the purpose of releasing the future order that was placed for Cologuard.  This is a necessary step in order for the results to be abstracted once they are available.  Navin Trevizo

## 2022-02-07 ENCOUNTER — TRANSFERRED RECORDS (OUTPATIENT)
Dept: HEALTH INFORMATION MANAGEMENT | Facility: CLINIC | Age: 72
End: 2022-02-07
Payer: COMMERCIAL

## 2022-02-07 LAB — COLOGUARD-ABSTRACT: NEGATIVE

## 2022-03-18 ENCOUNTER — OFFICE VISIT (OUTPATIENT)
Dept: FAMILY MEDICINE | Facility: CLINIC | Age: 72
End: 2022-03-18
Payer: COMMERCIAL

## 2022-03-18 VITALS
TEMPERATURE: 97.9 F | DIASTOLIC BLOOD PRESSURE: 66 MMHG | HEART RATE: 76 BPM | BODY MASS INDEX: 19.22 KG/M2 | WEIGHT: 108.5 LBS | SYSTOLIC BLOOD PRESSURE: 120 MMHG

## 2022-03-18 DIAGNOSIS — J02.9 SORE THROAT: Primary | ICD-10-CM

## 2022-03-18 DIAGNOSIS — R11.2 NON-INTRACTABLE VOMITING WITH NAUSEA, UNSPECIFIED VOMITING TYPE: ICD-10-CM

## 2022-03-18 LAB
ALBUMIN SERPL-MCNC: 4 G/DL (ref 3.5–5)
ALP SERPL-CCNC: 53 U/L (ref 45–120)
ALT SERPL W P-5'-P-CCNC: 11 U/L (ref 0–45)
ANION GAP SERPL CALCULATED.3IONS-SCNC: 10 MMOL/L (ref 5–18)
AST SERPL W P-5'-P-CCNC: 18 U/L (ref 0–40)
BILIRUB SERPL-MCNC: 0.7 MG/DL (ref 0–1)
BUN SERPL-MCNC: 17 MG/DL (ref 8–22)
CALCIUM SERPL-MCNC: 8.8 MG/DL (ref 8.5–10.5)
CHLORIDE BLD-SCNC: 104 MMOL/L (ref 98–107)
CO2 SERPL-SCNC: 28 MMOL/L (ref 22–31)
CREAT SERPL-MCNC: 0.64 MG/DL (ref 0.6–1.1)
DEPRECATED S PYO AG THROAT QL EIA: NEGATIVE
ERYTHROCYTE [DISTWIDTH] IN BLOOD BY AUTOMATED COUNT: 11.2 % (ref 10–15)
GFR SERPL CREATININE-BSD FRML MDRD: >90 ML/MIN/1.73M2
GLUCOSE BLD-MCNC: 121 MG/DL (ref 70–125)
GROUP A STREP BY PCR: NOT DETECTED
HCT VFR BLD AUTO: 39.7 % (ref 35–47)
HGB BLD-MCNC: 13.3 G/DL (ref 11.7–15.7)
MCH RBC QN AUTO: 31.2 PG (ref 26.5–33)
MCHC RBC AUTO-ENTMCNC: 33.5 G/DL (ref 31.5–36.5)
MCV RBC AUTO: 93 FL (ref 78–100)
MONOCYTES NFR BLD AUTO: NEGATIVE %
PLATELET # BLD AUTO: 206 10E3/UL (ref 150–450)
POTASSIUM BLD-SCNC: 3.8 MMOL/L (ref 3.5–5)
PROT SERPL-MCNC: 6.9 G/DL (ref 6–8)
RBC # BLD AUTO: 4.26 10E6/UL (ref 3.8–5.2)
SODIUM SERPL-SCNC: 142 MMOL/L (ref 136–145)
WBC # BLD AUTO: 10.5 10E3/UL (ref 4–11)

## 2022-03-18 PROCEDURE — 85027 COMPLETE CBC AUTOMATED: CPT | Performed by: FAMILY MEDICINE

## 2022-03-18 PROCEDURE — 36415 COLL VENOUS BLD VENIPUNCTURE: CPT | Performed by: FAMILY MEDICINE

## 2022-03-18 PROCEDURE — 86308 HETEROPHILE ANTIBODY SCREEN: CPT | Performed by: FAMILY MEDICINE

## 2022-03-18 PROCEDURE — 99213 OFFICE O/P EST LOW 20 MIN: CPT | Performed by: FAMILY MEDICINE

## 2022-03-18 PROCEDURE — 80053 COMPREHEN METABOLIC PANEL: CPT | Performed by: FAMILY MEDICINE

## 2022-03-18 PROCEDURE — 87651 STREP A DNA AMP PROBE: CPT | Performed by: FAMILY MEDICINE

## 2022-03-18 NOTE — PROGRESS NOTES
Assessment & Plan     (J02.9) Sore throat  (primary encounter diagnosis)  Comment: back of throat pain for 2 weeks. No fever, cough, sick contact. No covid vaccine vacciine. She had covid at 1/2022.   Strep and mono test negative with normal cbc. Exam not remarkable with normal vitals. DDx virus infection pharyngitis, acid reflux ( pt declined it).   Plan: Mononucleosis screen, Streptococcus A Rapid         Screen w/Reflex to PCR - Clinic Collect,         Otolaryngology Referral, Group A Streptococcus         PCR Throat Swab        Will have ent referral for further evaluation and treatment.   Advise salt water gargle, avoid spice diet.     (R11.2) Non-intractable vomiting with nausea, unspecified vomiting type  Comment: n/v comes and goes for 2 weeks. She vomited 4 days ago and then this am after woke up. No blood in emesis.   Suspect acid reflux and pt declined it. Unknown etiology. Advise otc tums prn.   Plan: Comprehensive metabolic panel (BMP + Alb, Alk         Phos, ALT, AST, Total. Bili, TP), CBC with         platelets               Return if symptoms worsen or fail to improve.    Ingrid Melchor MD  [facility] MATTHEW Ramos is a 71 year old who presents for the following health issues     HPI     Pt has sore throat for 2 weeks, located at very back of the throat and not able to see. She has nausea and vomiting comes and goes for 2 weeks. Last vomiting was this am after she woke up.   Denies fever, cough, sick contact, acid reflux, post nasal drainage. Denies smoke, alcohol and drugs .       Review of Systems   Constitutional, HEENT, cardiovascular, pulmonary, gi and gu systems are negative, except as otherwise noted.      Objective    /66 (BP Location: Left arm, Patient Position: Sitting, Cuff Size: Adult Regular)   Pulse 76   Temp 97.9  F (36.6  C)   Wt 49.2 kg (108 lb 8 oz)   BMI 19.22 kg/m    Body mass index is 19.22 kg/m .  Physical Exam   GENERAL: healthy,  alert and no distress  Normal ear, nose and sinus exam. Throat mild erythema and no swelling and exudate    NECK: no adenopathy, no asymmetry, masses, or scars and thyroid normal to palpation  RESP: lungs clear to auscultation - no rales, rhonchi or wheezes  CV: regular rate and rhythm, normal S1 S2, no S3 or S4, no murmur, click or rub, no peripheral edema and peripheral pulses strong  ABDOMEN: soft, nontender, no hepatosplenomegaly, no masses and bowel sounds normal       negative of strep and mono test.   CBC RESULTS: Recent Labs   Lab Test 03/18/22  1351   WBC 10.5   RBC 4.26   HGB 13.3   HCT 39.7   MCV 93   MCH 31.2   MCHC 33.5   RDW 11.2             Chest Pain    Chest pain can be caused by many different conditions which may or may not be dangerous. Causes include heartburn, lung infections, heart attack, blood clot in lungs, skin infections, strain or damage to muscle, cartilage, or bones, etc. In addition to a history and physical examination, an electrocardiogram (ECG) or other lab tests may have been performed to determine the cause of your chest pain. Follow up with your primary care provider or with a cardiologist as instructed.     SEEK IMMEDIATE MEDICAL CARE IF YOU HAVE ANY OF THE FOLLOWING SYMPTOMS: worsening chest pain, coughing up blood, unexplained back/neck/jaw pain, severe abdominal pain, dizziness or lightheadedness, fainting, shortness of breath, sweaty or clammy skin, vomiting, or racing heart beat. These symptoms may represent a serious problem that is an emergency. Do not wait to see if the symptoms will go away. Get medical help right away. Call 911 and do not drive yourself to the hospital.      Strain    A strain is a stretch or tear in one of the muscles in your body. This is caused by an injury to the area such as a twisting mechanism. Symptoms include pain, swelling, or bruising. Rest that area over the next several days and slowly resume activity when tolerated. Ice can help with swelling and pain.     SEEK IMMEDIATE MEDICAL CARE IF YOU HAVE ANY OF THE FOLLOWING SYMPTOMS: worsening pain, inability to move that body part, numbness or tingling.

## 2022-10-01 ENCOUNTER — HEALTH MAINTENANCE LETTER (OUTPATIENT)
Age: 72
End: 2022-10-01

## 2023-02-05 ENCOUNTER — HEALTH MAINTENANCE LETTER (OUTPATIENT)
Age: 73
End: 2023-02-05

## 2023-04-24 DIAGNOSIS — E05.90 THYROTOXICOSIS WITHOUT THYROID STORM, UNSPECIFIED THYROTOXICOSIS TYPE: ICD-10-CM

## 2023-04-25 NOTE — TELEPHONE ENCOUNTER
"Routing refill request to provider for review/approval because:  A break in medication  Patient needs to be seen because it has been more than 1 year since last office visit.  BP not current    Last Written Prescription Date:  12/13/21  Last Fill Quantity: 90,  # refills: 3   Last office visit provider:  3/18/22     Requested Prescriptions   Pending Prescriptions Disp Refills     atenolol (TENORMIN) 25 MG tablet [Pharmacy Med Name: Atenolol 25 MG Oral Tablet] 90 tablet 0     Sig: Take 1/2 (one-half) tablet by mouth twice daily       Beta-Blockers Protocol Failed - 4/25/2023  4:05 PM        Failed - Blood pressure under 140/90 in past 12 months     BP Readings from Last 3 Encounters:   03/18/22 120/66   12/13/21 130/68   06/19/20 132/64                 Failed - Recent (12 mo) or future (30 days) visit within the authorizing provider's specialty     Patient has had an office visit with the authorizing provider or a provider within the authorizing providers department within the previous 12 mos or has a future within next 30 days. See \"Patient Info\" tab in inbasket, or \"Choose Columns\" in Meds & Orders section of the refill encounter.              Passed - Patient is age 6 or older        Passed - Medication is active on med list             Papito Petit RN 04/25/23 4:05 PM  "

## 2023-04-26 RX ORDER — ATENOLOL 25 MG/1
TABLET ORAL
Qty: 90 TABLET | Refills: 0 | Status: SHIPPED | OUTPATIENT
Start: 2023-04-26 | End: 2023-08-15

## 2023-08-09 ASSESSMENT — ENCOUNTER SYMPTOMS
MYALGIAS: 0
CHILLS: 0
NERVOUS/ANXIOUS: 0
HEADACHES: 0
SORE THROAT: 0
DIZZINESS: 0
FEVER: 0
JOINT SWELLING: 0
HEARTBURN: 0
HEMATOCHEZIA: 0
EYE PAIN: 0
ABDOMINAL PAIN: 0
BREAST MASS: 0
DYSURIA: 0
CONSTIPATION: 0
COUGH: 0
HEMATURIA: 0
ARTHRALGIAS: 0
PARESTHESIAS: 0
SHORTNESS OF BREATH: 0
DIARRHEA: 0
PALPITATIONS: 0
NAUSEA: 0
WEAKNESS: 0
FREQUENCY: 0

## 2023-08-09 ASSESSMENT — ACTIVITIES OF DAILY LIVING (ADL): CURRENT_FUNCTION: NO ASSISTANCE NEEDED

## 2023-08-15 ENCOUNTER — OFFICE VISIT (OUTPATIENT)
Dept: FAMILY MEDICINE | Facility: CLINIC | Age: 73
End: 2023-08-15
Payer: COMMERCIAL

## 2023-08-15 VITALS
DIASTOLIC BLOOD PRESSURE: 68 MMHG | SYSTOLIC BLOOD PRESSURE: 112 MMHG | WEIGHT: 112 LBS | HEIGHT: 63 IN | BODY MASS INDEX: 19.84 KG/M2 | OXYGEN SATURATION: 99 % | HEART RATE: 58 BPM

## 2023-08-15 DIAGNOSIS — Z00.00 ENCOUNTER FOR MEDICARE ANNUAL WELLNESS EXAM: Primary | ICD-10-CM

## 2023-08-15 DIAGNOSIS — Z86.39 HISTORY OF GRAVES' DISEASE: ICD-10-CM

## 2023-08-15 DIAGNOSIS — Z11.59 NEED FOR HEPATITIS C SCREENING TEST: ICD-10-CM

## 2023-08-15 DIAGNOSIS — Z00.00 ROUTINE GENERAL MEDICAL EXAMINATION AT A HEALTH CARE FACILITY: ICD-10-CM

## 2023-08-15 DIAGNOSIS — Z78.0 POST-MENOPAUSAL: ICD-10-CM

## 2023-08-15 LAB
CHOLEST SERPL-MCNC: 238 MG/DL
ERYTHROCYTE [DISTWIDTH] IN BLOOD BY AUTOMATED COUNT: 11.1 % (ref 10–15)
HBA1C MFR BLD: 5.7 % (ref 0–5.6)
HCT VFR BLD AUTO: 38.9 % (ref 35–47)
HDLC SERPL-MCNC: 67 MG/DL
HGB BLD-MCNC: 13.4 G/DL (ref 11.7–15.7)
LDLC SERPL CALC-MCNC: 158 MG/DL
MCH RBC QN AUTO: 31.8 PG (ref 26.5–33)
MCHC RBC AUTO-ENTMCNC: 34.4 G/DL (ref 31.5–36.5)
MCV RBC AUTO: 92 FL (ref 78–100)
NONHDLC SERPL-MCNC: 171 MG/DL
PLATELET # BLD AUTO: 197 10E3/UL (ref 150–450)
RBC # BLD AUTO: 4.22 10E6/UL (ref 3.8–5.2)
TRIGL SERPL-MCNC: 64 MG/DL
TSH SERPL DL<=0.005 MIU/L-ACNC: 3.66 UIU/ML (ref 0.3–4.2)
WBC # BLD AUTO: 5 10E3/UL (ref 4–11)

## 2023-08-15 PROCEDURE — 80061 LIPID PANEL: CPT | Performed by: FAMILY MEDICINE

## 2023-08-15 PROCEDURE — 84443 ASSAY THYROID STIM HORMONE: CPT | Performed by: FAMILY MEDICINE

## 2023-08-15 PROCEDURE — 83036 HEMOGLOBIN GLYCOSYLATED A1C: CPT | Performed by: FAMILY MEDICINE

## 2023-08-15 PROCEDURE — 85027 COMPLETE CBC AUTOMATED: CPT | Performed by: FAMILY MEDICINE

## 2023-08-15 PROCEDURE — 36415 COLL VENOUS BLD VENIPUNCTURE: CPT | Performed by: FAMILY MEDICINE

## 2023-08-15 PROCEDURE — G0439 PPPS, SUBSEQ VISIT: HCPCS | Performed by: FAMILY MEDICINE

## 2023-08-15 RX ORDER — ATENOLOL 25 MG/1
TABLET ORAL
Qty: 90 TABLET | Refills: 3 | Status: SHIPPED | OUTPATIENT
Start: 2023-08-15 | End: 2024-09-19

## 2023-08-15 ASSESSMENT — ENCOUNTER SYMPTOMS
ARTHRALGIAS: 0
DYSURIA: 0
HEARTBURN: 0
SHORTNESS OF BREATH: 0
ABDOMINAL PAIN: 0
BREAST MASS: 0
EYE PAIN: 0
HEADACHES: 0
COUGH: 0
FREQUENCY: 0
NERVOUS/ANXIOUS: 0
CONSTIPATION: 0
DIARRHEA: 0
HEMATOCHEZIA: 0
DIZZINESS: 0
FEVER: 0
SORE THROAT: 0
WEAKNESS: 0
JOINT SWELLING: 0
PARESTHESIAS: 0
PALPITATIONS: 0
HEMATURIA: 0
MYALGIAS: 0
NAUSEA: 0
CHILLS: 0

## 2023-08-15 ASSESSMENT — ACTIVITIES OF DAILY LIVING (ADL): CURRENT_FUNCTION: NO ASSISTANCE NEEDED

## 2023-08-15 NOTE — PROGRESS NOTES
"SUBJECTIVE:   Rachel is a 73 year old who presents for Preventive Visit.      8/15/2023    10:49 AM   Additional Questions   Roomed by Ayana BURCIAGA LPN       Are you in the first 12 months of your Medicare coverage?  No    Healthy Habits:     In general, how would you rate your overall health?  Good    Frequency of exercise:  2-3 days/week    Duration of exercise:  15-30 minutes    Do you usually eat at least 4 servings of fruit and vegetables a day, include whole grains    & fiber and avoid regularly eating high fat or \"junk\" foods?  Yes    Taking medications regularly:  Yes    Medication side effects:  None    Ability to successfully perform activities of daily living:  No assistance needed    Home Safety:  No safety concerns identified    Hearing Impairment:  No hearing concerns    In the past 6 months, have you been bothered by leaking of urine?  No    In general, how would you rate your overall mental or emotional health?  Excellent    Additional concerns today:  No    She had a negative cologaurd in 2019    History of Graves/hyperthyroidism and took PTU, did not do an ablation. Not on meds in the past 15+ years. Has been on atenolol for the palpitations/tremor initially and on it ever since due to less anxiousness feeling.    Currently using the atenolol 12.5mg once daily (likes the Rx written as 1/2 tab twice daily though incase she needs to go back to it)    Have you ever done Advance Care Planning? (For example, a Health Directive, POLST, or a discussion with a medical provider or your loved ones about your wishes): Yes, patient states has an Advance Care Planning document and will bring a copy to the clinic.       Fall risk  Fallen 2 or more times in the past year?: No  Any fall with injury in the past year?: No    Cognitive Screening   1) Repeat 3 items (Leader, Season, Table)    2) Clock draw: NORMAL  3) 3 item recall: Recalls 3 objects  Results: 3 items recalled: COGNITIVE IMPAIRMENT LESS " LIKELY    Mini-CogTM Copyright S Amada. Licensed by the author for use in [facility]; reprinted with permission (sai@[facility]). All rights reserved.          Reviewed and updated as needed this visit by clinical staff                  Reviewed and updated as needed this visit by Provider                 Social History     Tobacco Use     Smoking status: Never     Smokeless tobacco: Never   Substance Use Topics     Alcohol use: Never             8/9/2023    10:37 AM   Alcohol Use   Prescreen: >3 drinks/day or >7 drinks/week? No          No data to display              Do you have a current opioid prescription? No  Do you use any other controlled substances or medications that are not prescribed by a provider? None        Current providers sharing in care for this patient include:   Patient Care Team:  Rachel Cortez MD as PCP - General (Family Medicine)  Ingrid Melchor MD as Assigned PCP    The following health maintenance items are reviewed in Epic and correct as of today:  Health Maintenance   Topic Date Due     COVID-19 Vaccine (1) Never done     ZOSTER IMMUNIZATION (2 of 2) 06/15/2016     Pneumococcal Vaccine: 65+ Years (1 - PCV) Never done     DTAP/TDAP/TD IMMUNIZATION (2 - Td or Tdap) 07/17/2023     INFLUENZA VACCINE (1) 09/01/2023     MAMMO SCREENING  12/28/2023     MEDICARE ANNUAL WELLNESS VISIT  08/15/2024     ANNUAL REVIEW OF HM ORDERS  08/15/2024     FALL RISK ASSESSMENT  08/15/2024     COLORECTAL CANCER SCREENING  02/07/2025     LIPID  12/13/2026     ADVANCE CARE PLANNING  08/15/2028     DEXA  12/28/2036     PHQ-2 (once per calendar year)  Completed     IPV IMMUNIZATION  Aged Out     MENINGITIS IMMUNIZATION  Aged Out     HEPATITIS C SCREENING  Discontinued       Review of Systems   Constitutional:  Negative for chills and fever.   HENT:  Negative for congestion, ear pain, hearing loss and sore throat.    Eyes:  Negative for pain and visual disturbance.   Respiratory:  Negative for  "cough and shortness of breath.    Cardiovascular:  Negative for chest pain, palpitations and peripheral edema.   Gastrointestinal:  Negative for abdominal pain, constipation, diarrhea, heartburn, hematochezia and nausea.   Breasts:  Negative for tenderness, breast mass and discharge.   Genitourinary:  Negative for dysuria, frequency, genital sores, hematuria, pelvic pain, urgency, vaginal bleeding and vaginal discharge.   Musculoskeletal:  Negative for arthralgias, joint swelling and myalgias.   Skin:  Negative for rash.   Neurological:  Negative for dizziness, weakness, headaches and paresthesias.   Psychiatric/Behavioral:  Negative for mood changes. The patient is not nervous/anxious.        OBJECTIVE:   /68 (BP Location: Left arm, Patient Position: Sitting, Cuff Size: Adult Regular)   Pulse 58   Ht 1.6 m (5' 3\")   Wt 50.8 kg (112 lb)   SpO2 99%   BMI 19.84 kg/m   Estimated body mass index is 19.84 kg/m  as calculated from the following:    Height as of this encounter: 1.6 m (5' 3\").    Weight as of this encounter: 50.8 kg (112 lb).  Physical Exam  GENERAL: healthy, alert and no distress  EYES: Eyes grossly normal to inspection, PERRL and conjunctivae and sclerae normal  HENT: ear canals and TM's normal, nose and mouth without ulcers or lesions  NECK: no adenopathy, no asymmetry, masses, or scars and thyroid normal to palpation  RESP: lungs clear to auscultation - no rales, rhonchi or wheezes  BREAST: declines  CV: regular rate and rhythm, normal S1 S2, no S3 or S4, no murmur, click or rub, no peripheral edema and peripheral pulses strong  ABDOMEN: soft, nontender, no hepatosplenomegaly, no masses and bowel sounds normal  MS: no gross musculoskeletal defects noted, no edema  SKIN: no suspicious lesions or rashes  NEURO: Normal strength and tone, mentation intact and speech normal  PSYCH: mentation appears normal, affect normal/bright    Diagnostic Test Results:  Labs reviewed in Epic    ASSESSMENT / " PLAN:       ICD-10-CM    1. Routine general medical examination at a health care facility  Z00.00 Hemoglobin A1c   Medicare wellness visit  TSH with free T4 reflex     CBC with platelets     Lipid panel reflex to direct LDL Fasting      2. Post-menopausal  Z78.0 DX Hip/Pelvis/Spine      3. History of Graves' disease  Z86.39 Stable, refilled bblocker  TSH today  atenolol (TENORMIN) 25 MG tablet      4. Need for hepatitis C screening test  Z11.59 CANCELED: Hepatitis C Screen Reflex to HCV RNA Quant and Genotype (pt declined          Patient has been advised of split billing requirements and indicates understanding: Yes      COUNSELING:  Reviewed preventive health counseling, as reflected in patient instructions        She reports that she has never smoked. She has never used smokeless tobacco.      Appropriate preventive services were discussed with this patient, including applicable screening as appropriate for cardiovascular disease, diabetes, osteopenia/osteoporosis, and glaucoma.  As appropriate for age/gender, discussed screening for colorectal cancer, prostate cancer, breast cancer, and cervical cancer. Checklist reviewing preventive services available has been given to the patient.    Reviewed patients plan of care and provided an AVS. The Basic Care Plan (routine screening as documented in Health Maintenance) for Rachel meets the Care Plan requirement. This Care Plan has been established and reviewed with the Patient.    Rachel Cortez MD  [facility]    Identified Health Risks:  I have reviewed Opioid Use Disorder and Substance Use Disorder risk factors and made any needed referrals.

## 2023-08-15 NOTE — PATIENT INSTRUCTIONS
There is a new shingles vaccine that is 97% effective. It is the Shingrix vaccine and is recommended for those 50 and older. We recommend the vaccine even if you have had shingles or the older vaccine against shingles- Zostavax. Insurance coverage for the vaccine varies. I recommend you check with your insurance to verify if, when and where it is covered. The vaccine is covered by most commercial insurance but Medicare does not cover the vaccine. It may be covered by Medicare Part D (your drug/pharmacy plan) and sometimes it is covered only at a pharmacy instead of here in the clinic. If it is covered in the clinic, you may schedule a nurse visit anytime to get the first dose of the vaccine. The second dose is recommended two months after the first and should be gotten by 6 months after the first.   About 10% of people will get a sore, red reaction at the site of the injection. Some people may also feel a little sick or nauseated after the injection. This may happen with either the first and/or second vaccine.    Patient Education   Personalized Prevention Plan  You are due for the preventive services outlined below.  Your care team is available to assist you in scheduling these services.  If you have already completed any of these items, please share that information with your care team to update in your medical record.  Health Maintenance Due   Topic Date Due     COVID-19 Vaccine (1) Never done     Zoster (Shingles) Vaccine (2 of 2) 06/15/2016     Pneumococcal Vaccine (1 - PCV) Never done     Diptheria Tetanus Pertussis (DTAP/TDAP/TD) Vaccine (2 - Td or Tdap) 07/17/2023

## 2023-11-09 DIAGNOSIS — Z12.11 COLON CANCER SCREENING: ICD-10-CM

## 2023-11-23 ENCOUNTER — LAB (OUTPATIENT)
Dept: FAMILY MEDICINE | Facility: CLINIC | Age: 73
End: 2023-11-23
Payer: COMMERCIAL

## 2023-11-23 DIAGNOSIS — Z12.11 COLON CANCER SCREENING: ICD-10-CM

## 2023-11-28 ENCOUNTER — PATIENT OUTREACH (OUTPATIENT)
Dept: CARE COORDINATION | Facility: CLINIC | Age: 73
End: 2023-11-28
Payer: COMMERCIAL

## 2023-12-26 ENCOUNTER — PATIENT OUTREACH (OUTPATIENT)
Dept: CARE COORDINATION | Facility: CLINIC | Age: 73
End: 2023-12-26
Payer: COMMERCIAL

## 2024-03-03 ENCOUNTER — HEALTH MAINTENANCE LETTER (OUTPATIENT)
Age: 74
End: 2024-03-03

## 2024-07-16 ENCOUNTER — PATIENT OUTREACH (OUTPATIENT)
Dept: CARE COORDINATION | Facility: CLINIC | Age: 74
End: 2024-07-16
Payer: COMMERCIAL

## 2024-07-30 ENCOUNTER — PATIENT OUTREACH (OUTPATIENT)
Dept: CARE COORDINATION | Facility: CLINIC | Age: 74
End: 2024-07-30
Payer: COMMERCIAL

## 2024-09-19 ENCOUNTER — OFFICE VISIT (OUTPATIENT)
Dept: FAMILY MEDICINE | Facility: CLINIC | Age: 74
End: 2024-09-19
Payer: COMMERCIAL

## 2024-09-19 VITALS
OXYGEN SATURATION: 99 % | BODY MASS INDEX: 19.77 KG/M2 | TEMPERATURE: 97.8 F | HEART RATE: 58 BPM | RESPIRATION RATE: 20 BRPM | WEIGHT: 111.56 LBS | HEIGHT: 63 IN | DIASTOLIC BLOOD PRESSURE: 51 MMHG | SYSTOLIC BLOOD PRESSURE: 130 MMHG

## 2024-09-19 DIAGNOSIS — Z12.31 VISIT FOR SCREENING MAMMOGRAM: ICD-10-CM

## 2024-09-19 DIAGNOSIS — Z78.0 POST-MENOPAUSAL: ICD-10-CM

## 2024-09-19 DIAGNOSIS — Z00.00 ENCOUNTER FOR MEDICARE ANNUAL WELLNESS EXAM: Primary | ICD-10-CM

## 2024-09-19 DIAGNOSIS — Z86.39 HISTORY OF GRAVES' DISEASE: ICD-10-CM

## 2024-09-19 DIAGNOSIS — R49.0 HOARSE VOICE QUALITY: ICD-10-CM

## 2024-09-19 PROBLEM — I73.9 PAD (PERIPHERAL ARTERY DISEASE) (H): Status: RESOLVED | Noted: 2024-09-19 | Resolved: 2024-09-19

## 2024-09-19 PROBLEM — I73.9 PAD (PERIPHERAL ARTERY DISEASE) (H): Status: ACTIVE | Noted: 2024-09-19

## 2024-09-19 LAB
ANION GAP SERPL CALCULATED.3IONS-SCNC: 9 MMOL/L (ref 7–15)
BUN SERPL-MCNC: 19.1 MG/DL (ref 8–23)
CALCIUM SERPL-MCNC: 9.7 MG/DL (ref 8.8–10.4)
CHLORIDE SERPL-SCNC: 103 MMOL/L (ref 98–107)
CHOLEST SERPL-MCNC: 236 MG/DL
CREAT SERPL-MCNC: 0.69 MG/DL (ref 0.51–0.95)
EGFRCR SERPLBLD CKD-EPI 2021: >90 ML/MIN/1.73M2
EST. AVERAGE GLUCOSE BLD GHB EST-MCNC: 111 MG/DL
FASTING STATUS PATIENT QL REPORTED: ABNORMAL
FASTING STATUS PATIENT QL REPORTED: NORMAL
GLUCOSE SERPL-MCNC: 97 MG/DL (ref 70–99)
HBA1C MFR BLD: 5.5 % (ref 0–5.6)
HCO3 SERPL-SCNC: 28 MMOL/L (ref 22–29)
HDLC SERPL-MCNC: 71 MG/DL
HGB BLD-MCNC: 13.6 G/DL (ref 11.7–15.7)
LDLC SERPL CALC-MCNC: 151 MG/DL
NONHDLC SERPL-MCNC: 165 MG/DL
POTASSIUM SERPL-SCNC: 4.1 MMOL/L (ref 3.4–5.3)
SODIUM SERPL-SCNC: 140 MMOL/L (ref 135–145)
TRIGL SERPL-MCNC: 70 MG/DL
TSH SERPL DL<=0.005 MIU/L-ACNC: 3.99 UIU/ML (ref 0.3–4.2)

## 2024-09-19 PROCEDURE — 84443 ASSAY THYROID STIM HORMONE: CPT | Performed by: FAMILY MEDICINE

## 2024-09-19 PROCEDURE — 80061 LIPID PANEL: CPT | Performed by: FAMILY MEDICINE

## 2024-09-19 PROCEDURE — 99213 OFFICE O/P EST LOW 20 MIN: CPT | Mod: 25 | Performed by: FAMILY MEDICINE

## 2024-09-19 PROCEDURE — 36415 COLL VENOUS BLD VENIPUNCTURE: CPT | Performed by: FAMILY MEDICINE

## 2024-09-19 PROCEDURE — 83036 HEMOGLOBIN GLYCOSYLATED A1C: CPT | Mod: GZ | Performed by: FAMILY MEDICINE

## 2024-09-19 PROCEDURE — 85018 HEMOGLOBIN: CPT | Performed by: FAMILY MEDICINE

## 2024-09-19 PROCEDURE — G0439 PPPS, SUBSEQ VISIT: HCPCS | Performed by: FAMILY MEDICINE

## 2024-09-19 PROCEDURE — 80048 BASIC METABOLIC PNL TOTAL CA: CPT | Performed by: FAMILY MEDICINE

## 2024-09-19 RX ORDER — ATENOLOL 25 MG/1
TABLET ORAL
Qty: 90 TABLET | Refills: 3 | Status: SHIPPED | OUTPATIENT
Start: 2024-09-19

## 2024-09-19 NOTE — PATIENT INSTRUCTIONS
Tdap and Shingrix at the pharmacy    Trial of flonase for the voice/post nasal drainage      Referral to [facility] was sent today

## 2024-09-19 NOTE — PROGRESS NOTES
Preventive Care Visit  [facility]  Rachel Cortez MD, Family Medicine  Sep 19, 2024      Assessment & Plan     Encounter for Medicare annual wellness exam  - Home safety information was reviewed if pertinent for falls prevention: regular checkups with vision and hearing, mindful medication use nixon with OTCs, using any assisted walking devices, adequate shoes and feet wear, avoiding loose rugs, safety additions to the home if needed, keeping the body in good shape with regular exercise especially walking, and limiting alcohol intake.  - Social history was reviewed  - Patient is independent with activities of daily living  - We reviewed active symptoms and discussed management  - We reviewed list of healthcare providers for this patient.  - We also reviewed PHQ 9    - Lipid panel reflex to direct LDL Non-fasting; Future  - BASIC METABOLIC PANEL  - Lipid panel reflex to direct LDL Fasting- declines statin  - Hemoglobin A1c  - Hemoglobin    History of Graves' disease  History of Graves/hyperthyroidism and took PTU, did not do an ablation. Not on meds in the past 15+ years. Has been on atenolol for the palpitations/tremor initially and on it ever since due to less anxiousness feeling.     Currently using the atenolol 12.5mg once daily (likes the Rx written as 1/2 tab twice daily though incase she needs to go back to it)     - atenolol (TENORMIN) 25 MG tablet; Take 1/2 (one-half) tablet by mouth twice daily  - TSH    Post-menopausal  - DX Bone Density; Future    Visit for screening mammogram  - MA Screening Bilateral w/ Humberto; Future    Hoarse voice quality  Advised trial flonase for post nasal drainage given cobblestoning on exam and her history of morning drainage with noted gravelly voice just in the mornings.  However given her age it is also reasonable to proceed with ENT referral for evaluation of her vocal cords down the road. No prior smoking hx   - Adult ENT  Referral;  Future    Patient has been advised of split billing requirements and indicates understanding: Yes        Counseling  Appropriate preventive services were addressed with this patient via screening, questionnaire, or discussion as appropriate for fall prevention, nutrition, physical activity, Tobacco-use cessation, social engagement, weight loss and cognition.  Checklist reviewing preventive services available has been given to the patient.  Reviewed patient's diet, addressing concerns and/or questions.   She is at risk for lack of exercise and has been provided with information to increase physical activity for the benefit of her well-being.   The patient was instructed to see the dentist every 6 months.       Follow-up 1 year    Subjective   Rachel is a 74 year old, presenting for the following:  Physical (Fasting)        2024     8:43 AM   Additional Questions   Roomed by YANELI Cordova        Health Care Directive  Patient does not have a Health Care Directive or Living Will: Discussed advance care planning with patient; information given to patient to review.    HPI  Chief Complaint   Patient presents with    Physical     Fasting     Noticing a gravely voice in the mornings  She does have some post nasal drainage  No smoking hx    A1c historically 5.7% in prediabetes range  3 months ago she completely stopped any added sugars! 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  Travels to [place] in the winter for a few weeks.    [employer] at [employer].    Rachel Cortez MD                       9/19/2024   General Health   How would you rate your overall physical health? Good   Feel stress (tense, anxious, or unable to sleep) Not at all            9/19/2024   Nutrition   Diet: Gluten-free/reduced    Other   If other, please elaborate: no sugar       Multiple values from one day are sorted in reverse-chronological order         9/19/2024   Exercise   Days per week of moderate/strenous exercise 3 days            9/19/2024   Social Factors   Frequency of gathering with friends or relatives Three times a week   Worry food won't last until get money to buy more No   Food not last or not have enough money for food? No   Do you have housing? (Housing is defined as stable permanent housing and does not include staying ouside in a car, in a tent, in an abandoned building, in an overnight shelter, or couch-surfing.) Yes   Are you worried about losing your housing? No   Lack of transportation? No   Unable to get utilities (heat,electricity)? No            9/19/2024   Fall Risk   Fallen 2 or more times in the past year? No    No   Trouble with walking or balance? No    No       Multiple values from one day are sorted in reverse-chronological order          9/19/2024   Activities of Daily Living- Home Safety   Needs help with the following daily activites None of the above   Safety concerns in the home None of the above            9/19/2024   Dental   Dentist two times every year? (!) NO            9/19/2024   Hearing Screening   Hearing concerns? None of the above            9/19/2024   Driving Risk Screening   Patient/family members have concerns about driving No            9/19/2024   General Alertness/Fatigue Screening   Have you been more tired than usual lately? No            9/19/2024   Urinary Incontinence Screening   Bothered by leaking urine in past 6 months No             9/19/2024   TB Screening   Were you born outside of the [place]? No            Today's PHQ-2 Score:       9/19/2024     8:50 AM   PHQ-2 ( 1999 [employer])   Q1: Little interest or pleasure in doing things 0   Q2: Feeling down, depressed or hopeless 0   PHQ-2 Score 0   Q1: Little interest or pleasure in doing things Not at all   Q2: Feeling down, depressed or hopeless Not at all   PHQ-2 Score 0           9/19/2024   Substance Use   Alcohol more than 3/day or more than 7/wk No   Do you have a current opioid prescription? No   How severe/bad is pain from 1 to 10? 0/10 (No Pain)   Do you use any other substances recreationally? No        Social History     Tobacco Use    Smoking status: Never    Smokeless tobacco: Never   Vaping Use    Vaping status: Never Used   Substance Use Topics    Alcohol use: Never    Drug use: Never           12/28/2021   LAST FHS-7 RESULTS   1st degree relative breast or ovarian cancer No   Any relative bilateral breast cancer No   Any male have breast cancer No   Any ONE woman have BOTH breast AND ovarian cancer No   Any woman with breast cancer before 50yrs No   2 or more relatives with breast AND/OR ovarian cancer No   2 or more relatives with breast AND/OR bowel cancer No               ASCVD Risk   The 10-year ASCVD risk score (Danielle BHATT, et al., 2019) is: 16.2%    Values used to calculate the score:      Age: 74 years      Sex: Female      Is Non- : No      Diabetic: No      Tobacco smoker: No      Systolic Blood Pressure: 130 mmHg      Is BP treated: Yes      HDL Cholesterol: 67 mg/dL      Total Cholesterol: 238 mg/dL            Reviewed and updated as needed this visit by Provider   Tobacco  Allergies  Meds  Problems  Med Hx  Surg Hx  Fam Hx              Current providers sharing in care for this patient include:  Patient Care Team:  Rachel Cortez MD as PCP - General (Family Medicine)  Rachel Cortez MD as Assigned  "PCP    The following health maintenance items are reviewed in Epic and correct as of today:  Health Maintenance   Topic Date Due    ZOSTER IMMUNIZATION (2 of 2) 06/15/2016    Pneumococcal Vaccine: 65+ Years (1 of 1 - PCV) Never done    BMP  03/18/2023    DTAP/TDAP/TD IMMUNIZATION (2 - Td or Tdap) 07/17/2023    MAMMO SCREENING  12/28/2023    MEDICARE ANNUAL WELLNESS VISIT  08/15/2024    LIPID  08/15/2024    ANNUAL REVIEW OF HM ORDERS  08/15/2024    INFLUENZA VACCINE (1) 09/01/2024    COVID-19 Vaccine (1 - 2024-25 season) Never done    COLORECTAL CANCER SCREENING  02/07/2025    GLUCOSE  03/18/2025    FALL RISK ASSESSMENT  09/19/2025    RSV VACCINE (1 - 1-dose 75+ series) 06/30/2027    ADVANCE CARE PLANNING  08/15/2028    DEXA  12/28/2036    PHQ-2 (once per calendar year)  Completed    HPV IMMUNIZATION  Aged Out    MENINGITIS IMMUNIZATION  Aged Out    RSV MONOCLONAL ANTIBODY  Aged Out    HEPATITIS C SCREENING  Discontinued            Objective    Exam  /51 (BP Location: Left arm, Patient Position: Sitting, Cuff Size: Adult Regular)   Pulse 58   Temp 97.8  F (36.6  C) (Oral)   Resp 20   Ht 1.6 m (5' 3\")   Wt 50.6 kg (111 lb 9 oz)   SpO2 99%   BMI 19.76 kg/m     Estimated body mass index is 19.76 kg/m  as calculated from the following:    Height as of this encounter: 1.6 m (5' 3\").    Weight as of this encounter: 50.6 kg (111 lb 9 oz).    Physical Exam  GENERAL: alert and no distress  EYES: Eyes grossly normal to inspection, PERRL and conjunctivae and sclerae normal  HENT: ear canals and TM's normal, nose and mouth without ulcers or lesions  NECK: no adenopathy, no asymmetry, masses, or scars  RESP: lungs clear to auscultation - no rales, rhonchi or wheezes  CV: regular rate and rhythm, normal S1 S2, no S3 or S4, no murmur, click or rub, no peripheral edema  ABDOMEN: soft, nontender, no hepatosplenomegaly, no masses and bowel sounds normal  MS: no gross musculoskeletal defects noted, no edema  SKIN: no " suspicious lesions or rashes  NEURO: Normal strength and tone, mentation intact and speech normal  PSYCH: mentation appears normal, affect normal/bright        9/19/2024   Mini Cog   Clock Draw Score 2 Normal   3 Item Recall 3 objects recalled   Mini Cog Total Score 5            Vision Screen  Reason Vision Screen Not Completed: Patient had exam in last 12 months      Signed Electronically by: Rachel Cortez MD

## 2024-11-26 ENCOUNTER — TELEPHONE (OUTPATIENT)
Dept: FAMILY MEDICINE | Facility: CLINIC | Age: 74
End: 2024-11-26
Payer: COMMERCIAL

## 2024-11-26 NOTE — TELEPHONE ENCOUNTER
Reason for Call:  Appointment Request    Patient requesting this type of appt:  Preventive     Requested provider: Rachel Cortez    Reason patient unable to be scheduled: Not within requested timeframe    When does patient want to be seen/preferred time: 1-2 days    Comments: pt request sooner appt     Could we send this information to you in [place] or would you prefer to receive a phone call?:   No preference   Okay to leave a detailed message?: N/A at Cell number on file:    Telephone Information:   Mobile [phone]       Call taken on 11/26/2024 at 1:36 PM by Ca Mcgee

## 2024-11-26 NOTE — TELEPHONE ENCOUNTER
[facility] for scheduling,  does not have availability until December/Jan.

## 2024-11-27 ENCOUNTER — NURSE TRIAGE (OUTPATIENT)
Dept: FAMILY MEDICINE | Facility: CLINIC | Age: 74
End: 2024-11-27
Payer: COMMERCIAL

## 2024-11-27 NOTE — TELEPHONE ENCOUNTER
"    Nurse Triage SBAR    Is this a 2nd Level Triage? YES, LICENSED PRACTITIONER REVIEW IS REQUIRED    Situation: Pt transferred to RN red line for red and swollen toes.     Background: Pt reports this has been occurring for years on and off.     Assessment: Red and swollen toes reported on and off for years and more often in winter months. Currently swelling is mild and on both feet but more left than right. Pain is 1-2/10 worse at the pinky toe and then spreading toward the great toe. She manages by going barefoot, elevation and foot soaks. Pt reports she saw a provider a few years ago and tests were run but nothing found. Reports if she is able to stay barefoot and off her feet it doesn't usually progress but if she wears shoes and has to be on her feet it gets much worse.     Protocol Recommended Disposition:   Discuss With PCP And Callback By Nurse Within 1 Hour    Recommendation: Please advise recommendation when Pt needs to be seen and what level of care, no appointments noted in home clinic this week.     Routed to provider    Does the patient meet one of the following criteria for ADS visit consideration? 16+ years old, with an [facility] PCP     TIP  Providers, please consider if this condition is appropriate for management at one of our Acute and Diagnostic Services sites.     If patient is a good candidate, please use dotphrase <dot>triageresponse and select Refer to ADS to document.     Reason for Disposition   Nursing judgment    Additional Information   Negative: Sounds like a life-threatening emergency to the triager   Negative: Information only call about a Well Adult (no illness or injury)   Negative: Caller can't be reached by phone   Negative: Nursing judgment   Negative: Nursing judgment   Negative: Nursing judgment   Negative: Nursing judgment    Answer Assessment - Initial Assessment Questions  1. REASON FOR CALL: \"What is your main concern right now?\"      Swollen toes  2. ONSET: \"When did the " "Swelling start?\"      On and off for years   3. SEVERITY: \"How bad is the swelling?\"      Mild at this time  4. FEVER: \"Do you have a fever?\"      No  5. OTHER SYMPTOMS: \"Do you have any other new symptoms?\"      Red and swollen toes   6. TREATMENTS AND RESPONSE: \"What have you done so far to try to make this better? What medicines have you used?\"      Elevation, soaks   7. PREGNANCY: \"Is there any chance you are pregnant?\" \"When was your last menstrual period?\"      N/A    Protocols used: No Protocol [id number]    "

## 2024-11-27 NOTE — TELEPHONE ENCOUNTER
11-27-24  I called pt attempted to schedule, pt advised her toes are swollen trans to RED RN line  Ni

## 2024-12-02 ENCOUNTER — OFFICE VISIT (OUTPATIENT)
Dept: FAMILY MEDICINE | Facility: CLINIC | Age: 74
End: 2024-12-02
Payer: COMMERCIAL

## 2024-12-02 VITALS
HEART RATE: 70 BPM | DIASTOLIC BLOOD PRESSURE: 47 MMHG | BODY MASS INDEX: 20.36 KG/M2 | HEIGHT: 63 IN | TEMPERATURE: 98.5 F | RESPIRATION RATE: 18 BRPM | WEIGHT: 114.9 LBS | OXYGEN SATURATION: 99 % | SYSTOLIC BLOOD PRESSURE: 112 MMHG

## 2024-12-02 DIAGNOSIS — M79.89 TOE SWELLING: Primary | ICD-10-CM

## 2024-12-02 LAB
CRP SERPL-MCNC: <3 MG/L
ERYTHROCYTE [SEDIMENTATION RATE] IN BLOOD BY WESTERGREN METHOD: 16 MM/HR (ref 0–30)

## 2024-12-02 PROCEDURE — 36415 COLL VENOUS BLD VENIPUNCTURE: CPT | Performed by: FAMILY MEDICINE

## 2024-12-02 PROCEDURE — 86038 ANTINUCLEAR ANTIBODIES: CPT | Performed by: FAMILY MEDICINE

## 2024-12-02 PROCEDURE — 99214 OFFICE O/P EST MOD 30 MIN: CPT | Performed by: FAMILY MEDICINE

## 2024-12-02 PROCEDURE — 86140 C-REACTIVE PROTEIN: CPT | Performed by: FAMILY MEDICINE

## 2024-12-02 PROCEDURE — 85652 RBC SED RATE AUTOMATED: CPT | Performed by: FAMILY MEDICINE

## 2024-12-02 PROCEDURE — 86039 ANTINUCLEAR ANTIBODIES (ANA): CPT | Performed by: FAMILY MEDICINE

## 2024-12-02 RX ORDER — PREDNISONE 20 MG/1
TABLET ORAL
Qty: 15 TABLET | Refills: 0 | Status: SHIPPED | OUTPATIENT
Start: 2024-12-02

## 2024-12-02 RX ORDER — TRIAMCINOLONE ACETONIDE 1 MG/G
CREAM TOPICAL 2 TIMES DAILY
Qty: 45 G | Refills: 0 | Status: SHIPPED | OUTPATIENT
Start: 2024-12-02

## 2024-12-02 NOTE — PROGRESS NOTES
Assessment & Plan     (M79.89) Toe swelling  (primary encounter diagnosis)  Comment: 12 years of intermittent recurrent toe swelling and pain, related to contact rather than any seasonal pattern.  This does not clearly fit into Raynaud's or gout or any obvious diagnosis, I suspect some type of underlying inflammation  Plan: triamcinolone (KENALOG) 0.1 % external cream,         predniSONE (DELTASONE) 20 MG tablet, CRP         inflammation, Erythrocyte sedimentation rate         auto, Anti Nuclear Cheryl IgG by IFA with Reflex,         Adult Rheumatology  Referral             PLAN:  1.  Laboratory studies as above  2.  Rheumatology referral  3.  Empiric treatment with anti-inflammatories that is topical triamcinolone, and a 10-day course of prednisone.                Subjective   Rachel is a 74 year old, presenting for the following health issues:  Swelling (Left and right Toe Swelling Red)        12/2/2024    12:47 PM   Additional Questions   Roomed by Neyda   Accompanied by Self     History of Present Illness       Reason for visit:  Toe swelling      Patient comes in because of a 12-year history of intermittent recurrent toe swelling and redness.  This began 12 years ago after she was wearing skates that were too tight, since then she has had intermittent recurrent toe swelling and redness, there is not a seasonal pattern to this, she has found to [employer] air that in the summer when she can wear sandals that do not touch her toes that she has less of this of an issue, obviously this is more difficult in the winter the patient thinks it is actually contact with the toes that are the issue.    Because of this the patient actually does not walk very far because even if she is wearing sandals any amount of contact with the toes even from the bottom up from sandals will cause the redness and swelling.    She has never been given a formal diagnosis of this, she has had blood work in the distant past and more  "recently which has not shown any obvious evidence of infection or inflammation though I suspect there must be some underlying inflammatory process.    Patient has been trying over-the-counter hydrocortisone, this does seem to be helping at least a little bit.    I discussed with the patient that this does not fit neatly into any category such as Raynaud's or gout, I am going to recheck markers of inflammation we will treat her empirically with topical and systemic steroid but I will also like her to see a rheumatologist but I told her it may be quite sometime before she gets and I encouraged her to take some of photographic evidence of what she is now having his toe swelling.                          Objective    /47 (BP Location: Left arm, Patient Position: Sitting, Cuff Size: Adult Regular)   Pulse 70   Temp 98.5  F (36.9  C) (Oral)   Resp 18   Ht 1.6 m (5' 3\")   Wt 52.1 kg (114 lb 14.4 oz)   SpO2 99%   BMI 20.35 kg/m    Body mass index is 20.35 kg/m .  Physical Exam   Examination of the toes reveals that 4 of the toes on the left foot are clearly red and swollen somewhat tender, several on the right as well of note feet feel a little cool but the dorsalis pedis pulse is clearly palpable bilaterally.              Signed Electronically by: Constantine Morris MD    "

## 2024-12-02 NOTE — LETTER
December 4, 2024      Rachel LAUREN Destiny  [address]   [place] [address]        Dear ,    We are writing to inform you of your test results.  2 markers of inflammation are normal or negative, another blood test called LEATHA is positive, sometimes the test is positive without any underlying diagnosis sometimes this is an indicator of an autoimmune disorder, however as discussed I want you to see a rheumatologist who will likely do much further testing to determine if you do have a specific type of an autoimmune disorder.        Resulted Orders   CRP inflammation   Result Value Ref Range    CRP Inflammation <3.00 <5.00 mg/L   Erythrocyte sedimentation rate auto   Result Value Ref Range    Erythrocyte Sedimentation Rate 16 0 - 30 mm/hr   Anti Nuclear Cheryl IgG by IFA with Reflex   Result Value Ref Range    LEATHA interpretation Positive (A) Negative      Comment:        Negative:              <1:40  Borderline Positive:   1:40 - 1:80  Positive:              >1:80    LEATHA pattern 1 Homogeneous     LEATHA titer 1 1:160        If you have any questions or concerns, please call the clinic at the number listed above.       Sincerely,      Constantine Morris MD

## 2024-12-04 LAB
ANA PAT SER IF-IMP: ABNORMAL
ANA SER QL IF: POSITIVE
ANA TITR SER IF: ABNORMAL {TITER}

## 2025-06-05 ENCOUNTER — TRANSFERRED RECORDS (OUTPATIENT)
Dept: HEALTH INFORMATION MANAGEMENT | Facility: CLINIC | Age: 75
End: 2025-06-05
Payer: COMMERCIAL

## 2025-08-20 ENCOUNTER — PATIENT OUTREACH (OUTPATIENT)
Dept: CARE COORDINATION | Facility: CLINIC | Age: 75
End: 2025-08-20
Payer: COMMERCIAL

## 2025-09-03 ENCOUNTER — PATIENT OUTREACH (OUTPATIENT)
Dept: CARE COORDINATION | Facility: CLINIC | Age: 75
End: 2025-09-03
Payer: COMMERCIAL